# Patient Record
Sex: MALE | Race: WHITE
[De-identification: names, ages, dates, MRNs, and addresses within clinical notes are randomized per-mention and may not be internally consistent; named-entity substitution may affect disease eponyms.]

---

## 2017-08-19 ENCOUNTER — HOSPITAL ENCOUNTER (INPATIENT)
Dept: HOSPITAL 25 - ED | Age: 82
LOS: 4 days | Discharge: NURSING, REHAB OR LONG-TERM CARE | DRG: 470 | End: 2017-08-23
Attending: HOSPITALIST | Admitting: HOSPITALIST
Payer: MEDICARE

## 2017-08-19 DIAGNOSIS — H91.90: ICD-10-CM

## 2017-08-19 DIAGNOSIS — Z82.49: ICD-10-CM

## 2017-08-19 DIAGNOSIS — Z72.89: ICD-10-CM

## 2017-08-19 DIAGNOSIS — Z88.8: ICD-10-CM

## 2017-08-19 DIAGNOSIS — K21.9: ICD-10-CM

## 2017-08-19 DIAGNOSIS — D72.828: ICD-10-CM

## 2017-08-19 DIAGNOSIS — M21.061: ICD-10-CM

## 2017-08-19 DIAGNOSIS — D47.3: ICD-10-CM

## 2017-08-19 DIAGNOSIS — F31.9: ICD-10-CM

## 2017-08-19 DIAGNOSIS — R47.01: ICD-10-CM

## 2017-08-19 DIAGNOSIS — Z66: ICD-10-CM

## 2017-08-19 DIAGNOSIS — F12.90: ICD-10-CM

## 2017-08-19 DIAGNOSIS — Z88.1: ICD-10-CM

## 2017-08-19 DIAGNOSIS — Z98.42: ICD-10-CM

## 2017-08-19 DIAGNOSIS — I10: ICD-10-CM

## 2017-08-19 DIAGNOSIS — Z82.3: ICD-10-CM

## 2017-08-19 DIAGNOSIS — Z87.891: ICD-10-CM

## 2017-08-19 DIAGNOSIS — I45.2: ICD-10-CM

## 2017-08-19 DIAGNOSIS — R54: ICD-10-CM

## 2017-08-19 DIAGNOSIS — Z83.3: ICD-10-CM
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
## 2017-08-19 DIAGNOSIS — Z90.79: ICD-10-CM

## 2017-08-19 DIAGNOSIS — D62: ICD-10-CM

## 2017-08-19 DIAGNOSIS — R47.1: ICD-10-CM
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
## 2017-08-19 DIAGNOSIS — Z98.41: ICD-10-CM

## 2017-08-19 DIAGNOSIS — E78.5: ICD-10-CM

## 2017-08-19 DIAGNOSIS — Z79.02: ICD-10-CM

## 2017-08-19 DIAGNOSIS — M17.11: ICD-10-CM

## 2017-08-19 DIAGNOSIS — Z86.73: ICD-10-CM

## 2017-08-19 DIAGNOSIS — S72.001A: Primary | ICD-10-CM

## 2017-08-19 LAB
ALBUMIN SERPL BCG-MCNC: 4.5 G/DL (ref 3.2–5.2)
ALP SERPL-CCNC: 81 U/L (ref 34–104)
ALT SERPL W P-5'-P-CCNC: 31 U/L (ref 7–52)
ANION GAP SERPL CALC-SCNC: 9 MMOL/L (ref 2–11)
AST SERPL-CCNC: 44 U/L (ref 13–39)
BUN SERPL-MCNC: 19 MG/DL (ref 6–24)
BUN/CREAT SERPL: 17.3 (ref 8–20)
CALCIUM SERPL-MCNC: 9.5 MG/DL (ref 8.6–10.3)
CHLORIDE SERPL-SCNC: 97 MMOL/L (ref 101–111)
GLOBULIN SER CALC-MCNC: 3.2 G/DL (ref 2–4)
GLUCOSE SERPL-MCNC: 119 MG/DL (ref 70–100)
HCO3 SERPL-SCNC: 27 MMOL/L (ref 22–32)
HCT VFR BLD AUTO: 39 % (ref 42–52)
HGB BLD-MCNC: 13.2 G/DL (ref 14–18)
MCH RBC QN AUTO: 29 PG (ref 27–31)
MCHC RBC AUTO-ENTMCNC: 34 G/DL (ref 31–36)
MCV RBC AUTO: 86 FL (ref 80–94)
POTASSIUM SERPL-SCNC: 4 MMOL/L (ref 3.5–5)
PROT SERPL-MCNC: 7.7 G/DL (ref 6.4–8.9)
RBC # BLD AUTO: 4.53 10^6/UL (ref 4–5.4)
SODIUM SERPL-SCNC: 133 MMOL/L (ref 133–145)
TROPONIN I SERPL-MCNC: 0.03 NG/ML (ref ?–0.04)
WBC # BLD AUTO: 18.4 10^3/UL (ref 3.5–10.8)

## 2017-08-19 PROCEDURE — 85018 HEMOGLOBIN: CPT

## 2017-08-19 PROCEDURE — 85025 COMPLETE CBC W/AUTO DIFF WBC: CPT

## 2017-08-19 PROCEDURE — 81015 MICROSCOPIC EXAM OF URINE: CPT

## 2017-08-19 PROCEDURE — 80048 BASIC METABOLIC PNL TOTAL CA: CPT

## 2017-08-19 PROCEDURE — 86900 BLOOD TYPING SEROLOGIC ABO: CPT

## 2017-08-19 PROCEDURE — 85027 COMPLETE CBC AUTOMATED: CPT

## 2017-08-19 PROCEDURE — 86850 RBC ANTIBODY SCREEN: CPT

## 2017-08-19 PROCEDURE — 85610 PROTHROMBIN TIME: CPT

## 2017-08-19 PROCEDURE — 88311 DECALCIFY TISSUE: CPT

## 2017-08-19 PROCEDURE — 85014 HEMATOCRIT: CPT

## 2017-08-19 PROCEDURE — C1776 JOINT DEVICE (IMPLANTABLE): HCPCS

## 2017-08-19 PROCEDURE — 72170 X-RAY EXAM OF PELVIS: CPT

## 2017-08-19 PROCEDURE — 88305 TISSUE EXAM BY PATHOLOGIST: CPT

## 2017-08-19 PROCEDURE — 93005 ELECTROCARDIOGRAM TRACING: CPT

## 2017-08-19 PROCEDURE — 83605 ASSAY OF LACTIC ACID: CPT

## 2017-08-19 PROCEDURE — 80053 COMPREHEN METABOLIC PANEL: CPT

## 2017-08-19 PROCEDURE — 36415 COLL VENOUS BLD VENIPUNCTURE: CPT

## 2017-08-19 PROCEDURE — 71010: CPT

## 2017-08-19 PROCEDURE — 86901 BLOOD TYPING SEROLOGIC RH(D): CPT

## 2017-08-19 PROCEDURE — 81003 URINALYSIS AUTO W/O SCOPE: CPT

## 2017-08-19 PROCEDURE — 85730 THROMBOPLASTIN TIME PARTIAL: CPT

## 2017-08-19 PROCEDURE — 84484 ASSAY OF TROPONIN QUANT: CPT

## 2017-08-19 RX ADMIN — ACETAMINOPHEN SCH MG: 325 TABLET ORAL at 18:22

## 2017-08-19 NOTE — RAD
HISTORY: Fall, right hip injury



COMPARISONS: None



VIEWS: 3, Frontal view of the pelvis with frontal and frog-leg views of the right hip



FINDINGS:



BONE DENSITY: There is diffuse osteopenia.

BONES: There is a slightly impacted and angulated fracture of the subcapital right femoral

neck.    

JOINTS: There is osteoarthritis of the hips    

ALIGNMENT: There is no dislocation. 

SOFT TISSUES: Unremarkable.



OTHER FINDINGS: Degenerative changes are noted of the spine



IMPRESSION: 

SLIGHTLY ANGULATED AND IMPACTED RIGHT FEMORAL NECK FRACTURE

## 2017-08-19 NOTE — HP
CC:  Dr. Nguyễn *

 

HISTORY AND PHYSICAL:

 

DATE OF ADMISSION:  17

 

PRIMARY CARE PROVIDER:  Dr. Nguyễn.

 

CHIEF COMPLAINT:  Right hip pain.

 

HISTORY OF PRESENT ILLNESS:  Mr. Tubbs is an 84-year-old male with a 
history of CVA in 2016, hypertension, hyperlipidemia, and depression 
who presents to the emergency room after sustaining a fall the day prior to 
admission.  Per the patient, he was trying to get up out of a chair and was 
utilizing his cane, though his right leg gave out and he was unable to hold 
himself up with a cane.  The patient fell to the right.  His son was presented 
at the time of the fall and helped to get him up and over to the couch.  The 
patient lied on the couch and there were concerned that his knee may have been 
injured.  Therefore, they iced his knee.  The patient was able to get up from 
the couch and with significant assistance from the son and using a 4-wheeled 
walker walk to his bedroom.  He lied in bed all night long.  He did not sleep 
well, however, due to pain.  The patient has had 3 doses of Extra Strength 
Tylenol since the fall yesterday.  The patient had continued pain.  Therefore, 
he came to the emergency room to be evaluated.  The patient denies any 
lightheadedness, chest pain, or shortness of breath prior to or at the time of 
his fall.  He overall is feeling okay at this point and denying any significant 
pain.

 

PAST MEDICAL HISTORY:

1.  History of left posterior MCA CVA in 2016.

2.  Hypertension.

3.  Hyperlipidemia.

4.  Depression.

 

PAST SURGICAL HISTORY:

1.  Appendectomy.

2.  Prostatectomy.

3.  Bilateral cataract extraction.

4.  Tonsillectomy.

 

MEDICATIONS:

1.  Omega 3 fatty acid 1000 mg p.o. daily.

2.  Omeprazole 20 mg p.o. daily.

3.  Metoprolol tartrate 12.5 mg p.o. twice daily.

4.  Lamotrigine 225 mg p.o. q.h.s.

5.  Ferrous gluconate 325 mg p.o. daily.

6.  Vitamin B12 1000 mcg p.o. daily.

7.  Plavix 75 mg p.o. daily.

8.  Lipitor 40 mg p.o. daily.

 

ALLERGIES:  No known drug allergies.

 

FAMILY HISTORY:  Mom  of what the patient's son believes is complications 
related to dementia.  Dad had a history of CVA.

 

SOCIAL HISTORY:  The patient is a former smoker.  He quit approximately 40 plus 
years ago.  He drinks alcohol on occasion.  He is a retired .  
He is .  He has 3 children.  His son, Eligio, is his healthcare proxy.

 

REVIEW OF SYSTEMS:  A complete 11-system review of systems is obtained.  
Pertinent positives and negatives are as per HPI and otherwise negative.

 

                               PHYSICAL EXAMINATION

 

GENERAL:  The patient is a well-developed, elderly thin male, lying in bed, in 
no acute distress.

 

VITAL SIGNS:  Blood pressure 141/60, pulse 67, respirations 16, temp 98.0, O2 
sat is 96% on room air.

 

HEENT:  Pupils are equal.  They are round.  There is evidence of prior cataract 
extraction.  Extraocular muscles are intact.  Oropharynx is clear.  Oral mucosa 
is moist.  There is no submandibular, cervical, or supraclavicular adenopathy. 
Thyroid is not enlarged.  No thyroid nodules are noted.

 

PULMONARY:  Lungs are clear anteriorly and at the lateral bases.

 

CARDIAC:  Normal S1 and S2.  Regular rate and rhythm.  I do not appreciate any 
murmurs, rubs, or gallops.

 

ABDOMEN:  Bowel sounds present.  Abdomen is soft, nontender, nondistended.

 

MUSCULOSKELETAL:  The right lower extremity is shortened and externally rotated.

 

SKIN:  Warm and dry.  I did not appreciate any rashes.  There is a large area 
of purple bruising on the right forearm that the patient's son believes is from 
him trying to help him up yesterday.

 

NEURO:  Cranial nerves II through XII are grossly intact.  Sensation is intact 
to light touch throughout.  The patient is mildly dysarthric and aphasic.

 

PSYCH:  The patient appears to be alert and oriented x3.  Affect appears 
appropriate.

 

 LABORATORY DATA:  WBC 18.4, hemoglobin 13.2, hematocrit 39, platelets 848.  
INR 0.98.  Sodium 133, potassium 4.0, chloride 97, CO2 27, BUN 19, creatinine 
1.10, glucose 119, lactic acid 1.5, calcium 9.5, albumin 1.4, AST 14, ALT 31, 
alk phos 81, troponin 0.03, albumin 4.5.  Right hip x-ray revealed slightly 
angulated and impacted right femoral neck fracture.  EKG revealed sinus rhythm 
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  This is 
unchanged from prior.

 

ASSESSMENT AND PLAN:  Mr. Tubbs is an 84-year-old male who sustained a 
mechanical fall the day prior to admission and is now found to have a right hip 
fracture.

 

1.  Right hip fracture.  Management of this will be per Orthopedics.  The 
patient in terms of his cardiac risk is not terribly active at home needing a 
walker or a cane to get around.  He denies any chest pain at this point.  The 
patient had an echocardiogram in 2016, which revealed an EF of 55% to 60
%.  Mild concentric LVH was observed.  There is increased basal septal 
hypertrophy noted without evidence of an increased gradient across the left 
ventricular outflow tract.  Left ventricular wall motion contractility were 
felt to be within normal limits.  At this point, I do believe the patient is 
optimized for surgery.  He has been on Plavix, however.  Per Dr. Head from 
the Plavix standpoint, the patient go to the OR tomorrow.  I do feel that the 
patient is likely moderate to high risk given his history of being on Plavix, 
his age, and overall somewhat frail-appearing nature.  Again, I do not feel 
that any further cardiac testing would be beneficial in this patient.

2.  DVT prophylaxis will consist of subcu heparin tonight being held this 
evening for planned surgery tomorrow.  The patient will be n.p.o. after 
midnight.

3.  Hypertension.  The patient will be maintained on his usual dose of 
metoprolol tartrate.  His blood pressure is under fair controlled in 130s to 
140s range.  At this point, I do not feel that we need to be more aggressive in 
lowering his blood pressure.

4.  Depression.  The patient will continue on his usual dose of lamotrigine.

5.  History of cerebrovascular accident.  The patient will continue on his 
statin, but his Plavix will be held for now though this will need to be 
restarted as soon as possible after being okayed by Orthopedics.

6.  DVT prophylaxis.  According to the Adult Thrombosis Prophylaxis Risk Factor 
Assessment Guide, the patient has a total risk factor score of 8 making him the 
highest risk,.  He will be placed on heparin 5000 units subcutaneous q.8 hours 
and SCDs.

7.  Code status is DNR.  Again, the patient indicates that his son, Eligio, is 
his healthcare proxy.

 

TIME SPENT:  65 minutes were spent admitting this patient.

 

[id number]/[facility] #: [id number]

HUBER

## 2017-08-19 NOTE — CONS
CONSULTATION REPORT:

 

DATE OF CONSULTATION:  08/19/17

 

HISTORY OF PRESENT ILLNESS:  The patient is an 84-year-old man, a community 
ambulator with his son with a walker or cane, who presents status post a fall 
at home, mechanical, sustained last night at approximately 6:30 p.m. on 08/18/17
, who presented via the emergency department today with complaints of right hip 
pain and x-rays demonstrated at a right hip fracture.  Orthopedic Surgery 
consultation was called.

 

I am very familiar with the patient and his son as the patient is a clinic 
patient of mine.  I have managed him for multiple months nonoperatively for 
right knee osteoarthritis, severe valgus type.  The patient's recent past 
medical history has been significant for a cerebrovascular accident with a 
prolonged recovery from that.

 

The patient and his son states that last night at approximately 6:30 p.m., the 
patient was getting up from a chair and turning, with a cane in his right hand 
when he lost his footing and fell.  He landed on his right side.  This was 
unwitnessed, but the patient's son came into the room and found him on the 
ground.

 

At first, the patient and his son thought that the pain was secondary to his 
right knee, that has been painful in the past.  His right knee was iced at 
first.  The patient rested.  When the patient had pain that persisted today 
that he localized closer to the hip, the patient's son decided to bring him to 
the emergency department for analysis.

 

The patient's last dose of Plavix was this morning.  The patient had treated 
the pain at home with only Tylenol prior to being brought to the emergency 
department.

 

The patient denies any head trauma or headache after his injury.

 

PAST MEDICAL HISTORY:  Recent cerebrovascular accident, in October 2016, acute, 
left, posterior, middle cerebral artery distribution[place] [facility] 
hypertension; hyperlipidemia; depression; bipolar disorder.

 

PAST SURGICAL HISTORY:  Prostatectomy, appendectomy. 



MEDICATIONS:

1.  Lipitor.

2.  Omeprazole.

3.  Lamictal.

4.  Metoprolol.

5.  Plavix 75 mg p.o. daily.

 

ALLERGIES:  AMOXICILLIN (unknown reaction), CELEBREX (unknown reaction).

 

SOCIAL HISTORY:  Smoking, rare alcohol use, marijuana use.

 

REVIEW OF SYSTEMS:  The patient denies fever, sweats, chills.  Denies current 
chest pain and shortness of breath.  Denies headache.

 

PHYSICAL EXAMINATION:  Vital Signs:  At 3:04 p.m. on 08/19/17, temperature 98.0 
degrees Fahrenheit, pulse 82, blood pressure 127/84, respiratory rate 16, and 
pulse ox 95% on room air.

 

No acute distress.  Alert and oriented, the patient's mood and affect are 
unchanged from last clinic exam with me.  The patient is somewhat hard of 
hearing and so sometimes does not fully grasp discussion points that his son 
will try and assist. The patient has a angry-looking visage, but was positive 
about his possible recovery and happy to see me (when I first had seen the 
patient in clinic, he seemed to prone to angry outbursts, but that has changed 
over the last several months).  Well coordinated bilateral upper and lower 
extremities.  I did not assess gait as I did not want the patient to be caused 
pain.

 

The patient is lying supine on stretcher in emergency department.  His right 
lower extremity is shortened and externally rotated.  Tenderness to palpation 
on the right hip area.  Pain with flexion or extension passively of the right 
hip. Neurovascularly intact distally.  No significant soft tissue swelling or 
bruising. Skin is intact.  Strength testing not assessed secondary to it would 
cause discomfort.

 

IMAGING:  Three x-ray views of right hip were obtained and demonstrate a 
displaced fracture of the right femoral neck.

 

ASSESSMENT:

1.  Right hip displaced femoral neck fracture.

2.  Multiple medical problems including a cerebrovascular accident in October 2016, ischemic.

 

PLAN:

1.  Nonweightbearing and pain control as of now.

2.  To the operating room for right hip bipolar hemiarthroplasty.  This can be 
done when the patient is optimized per the hospitalist service to which he is 
admitted.

3.  I discussed the surgical procedure and the rehabilitation timeline with the 
patient and his son.  They understood.

4.  I just recently reviewed with literature on stoppage of Plavix 
preoperatively for hip fractures and there are multiple studies including one 
in the [place] Journal of Medicine that dictate no increased morbidity or 
increased intraoperative bleeding with the immediate operation of these 
patients rather than waiting the 5 days, which is typical for elective 
surgeries with patients on Plavix.

5.  NPO after midnight with IV fluids in case the patient can go to the 
operating room on the morning of 08/20/17.

 

 [id number]/[facility] #: [id number]

HUBER

## 2017-08-19 NOTE — RAD
HISTORY: Fall, right hip injury



COMPARISONS: February 8, 2016



VIEWS:1: Single frontal portable view of the chest at 5:35 PM



FINDINGS:

LINES AND TUBES: None.

CARDIOMEDIASTINAL SILHOUETTE: The cardiomediastinal silhouette is normal for portable

technique.

PLEURA: The costophrenic angles are sharp. No pleural abnormalities are noted.

LUNG PARENCHYMA: There is hyperinflation.

ABDOMEN: The upper abdomen is clear. There is no subphrenic gas.

BONES AND SOFT TISSUES: No bone or soft tissue abnormalities are noted.



IMPRESSION: NO ACTIVE CARDIOPULMONARY DISEASE.

## 2017-08-20 LAB
ANION GAP SERPL CALC-SCNC: 2 MMOL/L (ref 2–11)
BUN SERPL-MCNC: 19 MG/DL (ref 6–24)
BUN/CREAT SERPL: 16.7 (ref 8–20)
CALCIUM SERPL-MCNC: 8.9 MG/DL (ref 8.6–10.3)
CHLORIDE SERPL-SCNC: 99 MMOL/L (ref 101–111)
GLUCOSE SERPL-MCNC: 97 MG/DL (ref 70–100)
HCO3 SERPL-SCNC: 30 MMOL/L (ref 22–32)
HCT VFR BLD AUTO: 36 % (ref 42–52)
HGB BLD-MCNC: 11.4 G/DL (ref 14–18)
MCH RBC QN AUTO: 28 PG (ref 27–31)
MCHC RBC AUTO-ENTMCNC: 32 G/DL (ref 31–36)
MCV RBC AUTO: 86 FL (ref 80–94)
POTASSIUM SERPL-SCNC: 4.4 MMOL/L (ref 3.5–5)
RBC # BLD AUTO: 4.11 10^6/UL (ref 4–5.4)
SODIUM SERPL-SCNC: 131 MMOL/L (ref 133–145)
WBC # BLD AUTO: 11.3 10^3/UL (ref 3.5–10.8)

## 2017-08-20 PROCEDURE — 0SRR01A REPLACEMENT OF RIGHT HIP JOINT, FEMORAL SURFACE WITH METAL SYNTHETIC SUBSTITUTE, UNCEMENTED, OPEN APPROACH: ICD-10-PCS | Performed by: ORTHOPAEDIC SURGERY

## 2017-08-20 RX ADMIN — ACETAMINOPHEN SCH MG: 325 TABLET ORAL at 00:22

## 2017-08-20 RX ADMIN — ACETAMINOPHEN SCH MG: 325 TABLET ORAL at 06:05

## 2017-08-20 RX ADMIN — LAMOTRIGINE SCH MG: 100 TABLET ORAL at 20:11

## 2017-08-20 RX ADMIN — CYANOCOBALAMIN TAB 500 MCG SCH: 500 TAB at 15:51

## 2017-08-20 RX ADMIN — ACETAMINOPHEN SCH MG: 325 TABLET ORAL at 17:14

## 2017-08-20 RX ADMIN — FENTANYL CITRATE PRN MCG: 0.05 INJECTION, SOLUTION INTRAMUSCULAR; INTRAVENOUS at 14:30

## 2017-08-20 RX ADMIN — Medication SCH: at 15:51

## 2017-08-20 RX ADMIN — LAMOTRIGINE SCH MG: 25 TABLET ORAL at 20:11

## 2017-08-20 RX ADMIN — ACETAMINOPHEN SCH: 325 TABLET ORAL at 14:21

## 2017-08-20 RX ADMIN — METOPROLOL TARTRATE SCH MG: 25 TABLET, FILM COATED ORAL at 20:11

## 2017-08-20 RX ADMIN — HYDROMORPHONE HYDROCHLORIDE PRN MG: 1 INJECTION, SOLUTION INTRAMUSCULAR; INTRAVENOUS; SUBCUTANEOUS at 14:31

## 2017-08-20 RX ADMIN — FENTANYL CITRATE PRN MCG: 0.05 INJECTION, SOLUTION INTRAMUSCULAR; INTRAVENOUS at 14:52

## 2017-08-20 RX ADMIN — ATORVASTATIN CALCIUM SCH MG: 40 TABLET, FILM COATED ORAL at 17:15

## 2017-08-20 RX ADMIN — MORPHINE SULFATE PRN MG: 2 INJECTION, SOLUTION INTRAMUSCULAR; INTRAVENOUS at 17:40

## 2017-08-20 RX ADMIN — METOPROLOL TARTRATE SCH MG: 25 TABLET, FILM COATED ORAL at 09:38

## 2017-08-20 RX ADMIN — FENTANYL CITRATE PRN MCG: 0.05 INJECTION, SOLUTION INTRAMUSCULAR; INTRAVENOUS at 14:25

## 2017-08-20 RX ADMIN — FENTANYL CITRATE PRN MCG: 0.05 INJECTION, SOLUTION INTRAMUSCULAR; INTRAVENOUS at 14:18

## 2017-08-20 RX ADMIN — HYDROMORPHONE HYDROCHLORIDE PRN MG: 1 INJECTION, SOLUTION INTRAMUSCULAR; INTRAVENOUS; SUBCUTANEOUS at 14:21

## 2017-08-20 NOTE — PN
Progress Note





- Progress Note


Date of Service: 08/20/17


SOAP: 


Subjective:


Right hip pain.





Optimized/cleared per medicine.





Objective:


NAD


RLE:


- short, externally rotated


- NVID


 Selected Entries











  08/20/17





  03:33


 


Temperature 98.7 F


 


Pulse Rate 64


 


Respiratory 14





Rate 


 


Blood Pressure 113/52





(mmHg) 


 


O2 Sat by Pulse 98





Oximetry 








 Laboratory Tests











  08/19/17 08/20/17 08/20/17





  15:36 00:40 06:46


 


WBC  18.4 H   11.3 H


 


Hct  39 L   36 L


 


Plt Count    656 H D


 


Urine Nitrate   Negative 


 


Ur Leukocyte Esterase   Negative 


 


Urine Bacteria   Absent 








Assessment:


Displaced femoral neck fracture





Plan:


-  to OR for right hip bipolar hemiarthroplasty this morning


-  hold anticoagulation (heparin SQ) and Plavix


-  NPO

## 2017-08-20 NOTE — ED
Adam SMYTH Thomas, scribed for Kunal Quach MD on 08/19/17 at 1552 .





Lower Extremity





- HPI Summary


HPI Summary: 





The pt is a 83 y/o MF presenting to the ED c/o R hip pain s/p a fall yesterday 

at 12:00. The pain is rated 5/10. The pain is aggravated by movement and 

alleviated by nothing. The patient has treated the pain with Tylenol 500mg PTA. 

At the time of evaluation, he has already received pain medication and his Hip 

XR has already been read by radiology. He has no other complaints at this time. 

PMHx: HTN, arthritis, depression, bipolar disorder. PSHx: prostatectomy, 

appendectomy. SHx: smoking, rare alcohol use, marijuana use. FHx: DM, CAD. 





- History of Current Complaint


Chief Complaint: EDExtremityLower


Stated Complaint: FALL/RT HIP INJURY


Time Seen by Provider: 08/19/17 15:48


Hx Obtained From: Patient


Mechanism Of Injury: Fall From A Standing Position


Onset/Duration: Days - yesterday


Severity Currently: Moderate


Pain Intensity: 5


Pain Scale Used: 0-10 Numeric


Timing: Constant


Associated Signs And Symptoms: Positive: Negative


Aggravating Factor(s): Movement


Alleviating Factor(s): Nothing





- Allergies/Home Medications


Allergies/Adverse Reactions: 


 Allergies











Allergy/AdvReac Type Severity Reaction Status Date / Time


 


Amoxicillin Allergy  Unknown Verified 10/08/16 18:37





   Reaction  





   Details  


 


Celecoxib [From Celebrex] Allergy  Unknown Verified 10/08/16 18:37





   Reaction  





   Details  














PMH/Surg Hx/FS Hx/Imm Hx


Previously Healthy: No


Cardiovascular History: Reports: Hx Angina, Hx Hypercholesterolemia, Hx 

Hypertension


   Denies: Hx Pacemaker/ICD


GI History: Reports: Hx Hiatal Hernia


Musculoskeletal History: Reports: Hx Arthritis - right knee, Hx Back Problems - 

lower back pain


Sensory History: Reports: Hx Contacts or Glasses - not with pt, Hx Hearing 

Problem - [place], does not wear hearing aides


   Denies: Hx Hearing Aid


Opthamlomology History: Reports: Hx Contacts or Glasses - not with pt


Psychiatric History: Reports: Hx Depression, Hx Bipolar Disorder, Hx Substance 

Abuse - marijuana and acid


   Denies: Hx Panic Disorder





- Surgical History


Surgery Procedure, Year, and Place: Prostatectomy, appendix removed as a child,

  LEFT FOOT WITH METAL PIN


Infectious Disease History: No


Infectious Disease History: 


   Denies: Traveled Outside the [place] in Last 30 Days





- Family History


Known Family History: Positive: Cardiac Disease, Diabetes





- Social History


Alcohol Use: Rare


Substance Use Type: Reports: Marijuana


Substance Use Comment - Amount & Last Used: 5-6 TIMES A WEEK IN PM,2HITS


Smoking Status (MU): Light Every Day Tobacco Smoker


Type: Pipe


Length of Time of Smoking/Using Tobacco: PIPE 1-2 TIMES A DAY, DOES NOT INHALE


Have You Smoked in the Last Year: Yes - PIPE ONLY, ONCE OR TWICE A DAY





Review of Systems


Negative: Fever


Positive: Other - POS: R hip pain s/p a fall yesterday


All Other Systems Reviewed And Are Negative: Yes





Physical Exam


Triage Information Reviewed: Yes


Vital Signs On Initial Exam: 


 Initial Vitals











Temp Pulse Resp BP Pulse Ox


 


 97.9 F   61   18   108/73   98 


 


 08/19/17 11:54  08/19/17 11:54  08/19/17 11:54  08/19/17 11:54  08/19/17 11:54











Vital Signs Reviewed: Yes


Appearance: Positive: Well-Appearing, No Pain Distress, Well-Nourished


Skin: Positive: Warm, Skin Color Reflects Adequate Perfusion, Dry


Head/Face: Positive: Normal Head/Face Inspection


Eyes: Positive: Normal


ENT: Positive: Normal ENT inspection


Neck: Positive: Supple, Nontender


Respiratory/Lung Sounds: Positive: Clear to Auscultation, Breath Sounds Present


Cardiovascular: Positive: RRR


Abdomen Description: Positive: Nontender, Soft


Bowel Sounds: Positive: Present


Musculoskeletal: Positive: Other - The right leg is inwardly angulated. It is 

tender to range of motion.


Neurological: Positive: Normal


Psychiatric: Positive: Normal, Affect/Mood Appropriate





Diagnostics





- Vital Signs


 Vital Signs











  Temp Pulse Resp BP Pulse Ox


 


 08/19/17 15:38    16  


 


 08/19/17 15:04  98.0 F  82  16  127/84  95


 


 08/19/17 14:16  98.5 F  60  16  102/63  99


 


 08/19/17 11:54  97.9 F  61  18  108/73  98














- Laboratory


Lab Results: 


 Lab Results











  08/19/17 08/19/17 08/19/17 Range/Units





  15:36 15:36 15:36 


 


WBC  18.4 H    (3.5-10.8)  10^3/ul


 


RBC  4.53    (4.0-5.4)  10^6/ul


 


Hgb  13.2 L    (14.0-18.0)  g/dl


 


Hct  39 L    (42-52)  %


 


MCV  86    (80-94)  fL


 


MCH  29    (27-31)  pg


 


MCHC  34    (31-36)  g/dl


 


RDW  15    (10.5-15)  %


 


Plt Count  848 H    (150-450)  10^3/ul


 


MPV  8    (7.4-10.4)  um3


 


Neut % (Auto)  86.7 H    (38-83)  %


 


Lymph % (Auto)  5.1 L    (25-47)  %


 


Mono % (Auto)  6.1    (1-9)  %


 


Eos % (Auto)  1.3    (0-6)  %


 


Baso % (Auto)  0.8    (0-2)  %


 


Absolute Neuts (auto)  15.9 H    (1.5-7.7)  10^3/ul


 


Absolute Lymphs (auto)  0.9 L    (1.0-4.8)  10^3/ul


 


Absolute Monos (auto)  1.1 H    (0-0.8)  10^3/ul


 


Absolute Eos (auto)  0.2    (0-0.6)  10^3/ul


 


Absolute Basos (auto)  0.1    (0-0.2)  10^3/ul


 


Absolute Nucleated RBC  0    10^3/ul


 


Nucleated RBC %  0    


 


INR (Anticoag Therapy)   0.98   (0.89-1.11)  


 


APTT   38.9 H   (26.0-36.3)  seconds


 


Sodium    133  (133-145)  mmol/L


 


Potassium    4.0  (3.5-5.0)  mmol/L


 


Chloride    97 L  (101-111)  mmol/L


 


Carbon Dioxide    27  (22-32)  mmol/L


 


Anion Gap    9  (2-11)  mmol/L


 


BUN    19  (6-24)  mg/dL


 


Creatinine    1.10  (0.67-1.17)  mg/dL


 


Est GFR ( Amer)    82.0  (>60)  


 


Est GFR (Non-Af Amer)    63.8  (>60)  


 


BUN/Creatinine Ratio    17.3  (8-20)  


 


Glucose    119 H  ()  mg/dL


 


Lactic Acid     (0.5-2.0)  mmol/L


 


Calcium    9.5  (8.6-10.3)  mg/dL


 


Total Bilirubin    1.00  (0.2-1.0)  mg/dL


 


AST    44 H  (13-39)  U/L


 


ALT    31  (7-52)  U/L


 


Alkaline Phosphatase    81  ()  U/L


 


Troponin I    0.03  (<0.04)  ng/mL


 


Total Protein    7.7  (6.4-8.9)  g/dL


 


Albumin    4.5  (3.2-5.2)  g/dL


 


Globulin    3.2  (2-4)  g/dL


 


Albumin/Globulin Ratio    1.4  (1-3)  


 


Blood Type     


 


Antibody Screen     














  08/19/17 08/19/17 Range/Units





  15:36 15:36 


 


WBC    (3.5-10.8)  10^3/ul


 


RBC    (4.0-5.4)  10^6/ul


 


Hgb    (14.0-18.0)  g/dl


 


Hct    (42-52)  %


 


MCV    (80-94)  fL


 


MCH    (27-31)  pg


 


MCHC    (31-36)  g/dl


 


RDW    (10.5-15)  %


 


Plt Count    (150-450)  10^3/ul


 


MPV    (7.4-10.4)  um3


 


Neut % (Auto)    (38-83)  %


 


Lymph % (Auto)    (25-47)  %


 


Mono % (Auto)    (1-9)  %


 


Eos % (Auto)    (0-6)  %


 


Baso % (Auto)    (0-2)  %


 


Absolute Neuts (auto)    (1.5-7.7)  10^3/ul


 


Absolute Lymphs (auto)    (1.0-4.8)  10^3/ul


 


Absolute Monos (auto)    (0-0.8)  10^3/ul


 


Absolute Eos (auto)    (0-0.6)  10^3/ul


 


Absolute Basos (auto)    (0-0.2)  10^3/ul


 


Absolute Nucleated RBC    10^3/ul


 


Nucleated RBC %    


 


INR (Anticoag Therapy)    (0.89-1.11)  


 


APTT    (26.0-36.3)  seconds


 


Sodium    (133-145)  mmol/L


 


Potassium    (3.5-5.0)  mmol/L


 


Chloride    (101-111)  mmol/L


 


Carbon Dioxide    (22-32)  mmol/L


 


Anion Gap    (2-11)  mmol/L


 


BUN    (6-24)  mg/dL


 


Creatinine    (0.67-1.17)  mg/dL


 


Est GFR ( Amer)    (>60)  


 


Est GFR (Non-Af Amer)    (>60)  


 


BUN/Creatinine Ratio    (8-20)  


 


Glucose    ()  mg/dL


 


Lactic Acid  1.5   (0.5-2.0)  mmol/L


 


Calcium    (8.6-10.3)  mg/dL


 


Total Bilirubin    (0.2-1.0)  mg/dL


 


AST    (13-39)  U/L


 


ALT    (7-52)  U/L


 


Alkaline Phosphatase    ()  U/L


 


Troponin I    (<0.04)  ng/mL


 


Total Protein    (6.4-8.9)  g/dL


 


Albumin    (3.2-5.2)  g/dL


 


Globulin    (2-4)  g/dL


 


Albumin/Globulin Ratio    (1-3)  


 


Blood Type   O Positive  


 


Antibody Screen   Negative  











Result Diagrams: 


 08/20/17 06:46





 08/20/17 06:46


Lab Statement: Any lab studies that have been ordered have been reviewed, and 

results considered in the medical decision making process.





- Radiology


  ** Hip XR


Xray Interpretation: Positive (See Comments) - SLIGHTLY ANGULATED AND IMPACTED 

RIGHT FEMORAL NECK FRACTURE.


Radiology Interpretation Completed By: Radiologist





Lower Extremity Course/Dx





- Course


Course Of Treatment: Mr. Tubbs fell yesterday and sustained a right hip 

fracture.  He is being admitted to the hospitalist service with a consult by 

Dr. Olson.





- Diagnoses


Provider Diagnoses: 


 Closed right hip fracture








- Physician Notifications


Discussed Care Of Patient With: Amelia Pemberton


Time Discussed With Above Provider: 15:54


Instructed by Provider To: Other - Dr. Pemberton, hospitalist, admits the patient 

at 15:54.





Discharge





- Discharge Plan


Condition: Fair


Disposition: ADMITTED TO [facility] documentation as recorded by the Adam desouza Thomas accurately reflects 

the service I personally performed and the decisions made by me, Kunal Quach MD.

## 2017-08-20 NOTE — RAD
INDICATION: Right hip arthroplasty     



COMPARISON: August 19, 2017 

 

TECHNIQUE: A single view the pelvis is submitted.



FINDINGS: There is right hip arthroplasty. The prosthesis appears well seated. There are

skin staples with soft tissue changes compatible with the recent surgery. Multiple

surgical loops project over the minor pelvis consistent with prostatectomy



IMPRESSION:  RIGHT HIP ARTHROPLASTY. THE PROSTHESIS APPEARS WELL SEATED

## 2017-08-20 NOTE — PN
Subjective


Date of Service: 08/20/17


Interval History: 


HOSPITALIST PROGRESS NOTE


Patient seen and examined at bedside.


He states his pain is controlled, offers no complaints at this time.


Family History: Unchanged from Admission


Social History: Unchanged from Admission


Past Medical History: Unchanged from Admission





Objective


Active Medications: 








Acetaminophen (Tylenol Tab*)  975 mg PO Q6HR [facility]


   Last Admin: 08/20/17 06:05 Dose:  975 mg


Atorvastatin Calcium (Lipitor*)  40 mg PO 1700 [facility]


Cyanocobalamin (Vitamin B12 Tab*)  1,000 mcg PO DAILY [facility]


Dimenhydrinate (Dramamine Iv*)  12.5 mg IV PUSH ONCE PRN


   PRN Reason: NAUSEA/VOMITING


   Stop: 08/20/17 12:33


Fentanyl Citrate (Fentanyl*)  25 mcg IV Q5M PRN


   PRN Reason: PAIN - MODERATE


   Stop: 08/20/17 12:53


Ferrous Gluconate (Fergon Tab*)  325 mg PO DAILY [facility]


Hydromorphone HCl (Dilaudid Iv*)  0.1 mg IV Q10M PRN


   PRN Reason: PAIN - SEVERE


   Stop: 08/20/17 13:13


Lactated Ringer's (Lactated Ringers 1000 Ml Bag*)  1,000 mls @ 125 mls/hr IV 

PER RATE [facility]


   Last Admin: 08/20/17 06:01 Dose:  125 mls/hr


Lamotrigine (Lamictal Tab(*))  200 mg PO BEDTIME [facility]


Lamotrigine (Lamictal Tab(*))  25 mg PO BEDTIME [facility]


Metoprolol Tartrate (Lopressor Tab*)  12.5 mg PO Q12HR [facility]


   Last Admin: 08/20/17 09:38 Dose:  12.5 mg


Morphine Sulfate (Morphine Inj (Syringe)*)  2 mg IV Q4H PRN


   PRN Reason: PAIN - MILD


Omeprazole (Prilosec Cap*)  20 mg PO DAILY [facility]


   Last Admin: 08/20/17 09:38 Dose:  20 mg


Oxycodone HCl (Roxycodone Tab*)  5 mg PO Q4H PRN


   PRN Reason: PAIN








Vital Signs











  08/20/17





  08:07


 


Temperature 97.8 F


 


Pulse Rate 64


 


Respiratory 12





Rate 


 


Blood Pressure 126/60





(mmHg) 


 


O2 Sat by Pulse 98





Oximetry 











Oxygen Devices in Use Now: Nasal Cannula


Appearance: Elderly male lying in bed in NAD.


Eyes: No Scleral Icterus


Ears/Nose/Mouth/Throat: Mucous Membranes Moist


Neck: Trachea Midline


Respiratory: Symmetrical Chest Expansion and Respiratory Effort, Clear to 

Auscultation


Cardiovascular: RRR - Normal S1 and S2


Abdominal: NL Sounds; No Tenderness; No Distention


Extremities: No Edema, - - RLE shortened, externally rotated


Neurological: Alert and Oriented x 3, NL Muscle Strength and Tone


Lines/Tubes/Other Access: Clean, Dry and Intact Peripheral IV


Result Diagrams: 


 08/20/17 06:46





 08/20/17 06:46





Assess/Plan/Problems-Billing


Assessment: 


Mr. Tubbs is an 83yo M with PMH of CVA, HTN, HLD, depression, who 

presented to ED with c/o right hip pain after a mechanical fall, found to have 

right femoral neck fracture.





- Patient Problems


(1) Fracture of femoral neck, right, closed


Comment: - Ortho input appreciated - plan for OR today.


- Patient has no complaints of CP, dyspnea, palpitations. Agree with Dr. Pemberton'

s assessment patient is optimized for surgery.   





(2) Thrombocytosis


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 

elevated since 2015.


- Probably higher now due to stress associated with fall and fracture.


- Patient not interested in further w/u, but can continue conversations as 

outpatient.   





(3) Hypertension


Comment: - Controlled.


- Continue Metoprolol.   





(4) Hyperlipidemia


Comment: - Continue statin.   





(5) H/O: CVA (cerebrovascular accident)


Comment: - Plavix on hold - will resume when okay with Ortho.   





(6) DNR (do not resuscitate)





Status and Disposition: 


Inpatient for surgical management of hip fracture.

## 2017-08-21 LAB
ANION GAP SERPL CALC-SCNC: 1 MMOL/L (ref 2–11)
BUN SERPL-MCNC: 17 MG/DL (ref 6–24)
BUN/CREAT SERPL: 17.5 (ref 8–20)
CALCIUM SERPL-MCNC: 8.2 MG/DL (ref 8.6–10.3)
CHLORIDE SERPL-SCNC: 97 MMOL/L (ref 101–111)
GLUCOSE SERPL-MCNC: 109 MG/DL (ref 70–100)
HCO3 SERPL-SCNC: 29 MMOL/L (ref 22–32)
HCT VFR BLD AUTO: 26 % (ref 42–52)
HGB BLD-MCNC: 8.6 G/DL (ref 14–18)
POTASSIUM SERPL-SCNC: 4.2 MMOL/L (ref 3.5–5)
SODIUM SERPL-SCNC: 127 MMOL/L (ref 133–145)

## 2017-08-21 RX ADMIN — MORPHINE SULFATE PRN MG: 2 INJECTION, SOLUTION INTRAMUSCULAR; INTRAVENOUS at 14:58

## 2017-08-21 RX ADMIN — ACETAMINOPHEN SCH MG: 325 TABLET ORAL at 00:20

## 2017-08-21 RX ADMIN — MORPHINE SULFATE PRN MG: 2 INJECTION, SOLUTION INTRAMUSCULAR; INTRAVENOUS at 19:43

## 2017-08-21 RX ADMIN — OXYCODONE HYDROCHLORIDE AND ACETAMINOPHEN PRN TAB: 5; 325 TABLET ORAL at 11:26

## 2017-08-21 RX ADMIN — LAMOTRIGINE SCH MG: 25 TABLET ORAL at 22:09

## 2017-08-21 RX ADMIN — PANTOPRAZOLE SODIUM SCH MG: 40 TABLET, DELAYED RELEASE ORAL at 09:35

## 2017-08-21 RX ADMIN — OXYCODONE HYDROCHLORIDE AND ACETAMINOPHEN PRN TAB: 5; 325 TABLET ORAL at 22:07

## 2017-08-21 RX ADMIN — ACETAMINOPHEN SCH MG: 325 TABLET ORAL at 06:19

## 2017-08-21 RX ADMIN — LAMOTRIGINE SCH MG: 100 TABLET ORAL at 22:08

## 2017-08-21 RX ADMIN — CLOPIDOGREL SCH MG: 75 TABLET, FILM COATED ORAL at 09:35

## 2017-08-21 RX ADMIN — OXYCODONE HYDROCHLORIDE AND ACETAMINOPHEN PRN TAB: 5; 325 TABLET ORAL at 17:02

## 2017-08-21 RX ADMIN — METOPROLOL TARTRATE SCH: 25 TABLET, FILM COATED ORAL at 08:21

## 2017-08-21 RX ADMIN — METOPROLOL TARTRATE SCH: 25 TABLET, FILM COATED ORAL at 22:09

## 2017-08-21 RX ADMIN — ENOXAPARIN SODIUM SCH MG: 40 INJECTION SUBCUTANEOUS at 06:19

## 2017-08-21 RX ADMIN — CYANOCOBALAMIN TAB 500 MCG SCH MCG: 500 TAB at 09:35

## 2017-08-21 RX ADMIN — ATORVASTATIN CALCIUM SCH MG: 40 TABLET, FILM COATED ORAL at 17:02

## 2017-08-21 RX ADMIN — Medication SCH MG: at 09:35

## 2017-08-21 NOTE — PN
Subjective


Date of Service: 08/21/17


Interval History: 


HOSPITALIST PROGRESS NOTE


Patient seen and examined at bedside.


He feels well today. Was able to ambulate with PT to his door and back. Pain is 

controlled.


Family History: Unchanged from Admission


Social History: Unchanged from Admission


Past Medical History: Unchanged from Admission





Objective


Active Medications: 








Acetaminophen (Tylenol Tab*)  975 mg PO Q6HR PRN


   PRN Reason: mild pain/fever


Atorvastatin Calcium (Lipitor*)  40 mg PO 1700 [facility]


   Last Admin: 08/20/17 17:15 Dose:  40 mg


Clopidogrel Bisulfate (Plavix Tab*)  75 mg PO DAILY [facility]


   Last Admin: 08/21/17 09:35 Dose:  75 mg


Cyanocobalamin (Vitamin B12 Tab*)  1,000 mcg PO DAILY [facility]


   Last Admin: 08/21/17 09:35 Dose:  1,000 mcg


Enoxaparin Sodium (Lovenox(*))  40 mg SUBCUT Q24H [facility]


   Last Admin: 08/21/17 06:19 Dose:  40 mg


Ferrous Gluconate (Fergon Tab*)  325 mg PO DAILY [facility]


   Last Admin: 08/21/17 09:35 Dose:  324 mg


Lamotrigine (Lamictal Tab(*))  100 mg PO BEDTIME [facility]


   Last Admin: 08/20/17 20:11 Dose:  100 mg


Lamotrigine (Lamictal Tab(*))  25 mg PO BEDTIME [facility]


   Last Admin: 08/20/17 20:11 Dose:  25 mg


Magnesium Hydroxide (Milk Of Magnesia Liq*)  30 ml PO Q4H PRN


   PRN Reason: CONSTIPATION


Metoprolol Tartrate (Lopressor Tab*)  12.5 mg PO Q12HR [facility]


   Last Admin: 08/21/17 08:21 Dose:  Not Given


Morphine Sulfate (Morphine Inj (Syringe)*)  2 mg IV Q3H PRN


   PRN Reason: PAIN


   Last Admin: 08/21/17 14:58 Dose:  2 mg


Ondansetron HCl (Zofran Inj*)  4 mg IV Q6H PRN


   PRN Reason: NAUSEA/VOMITING


   Last Admin: 08/20/17 17:15 Dose:  4 mg


Oxycodone/Acetaminophen (Percocet 5/325 Tab*)  1 tab PO Q4H PRN


   PRN Reason: PAIN - MODERATE


Oxycodone/Acetaminophen (Percocet 5/325 Tab*)  2 tab PO Q4H PRN


   PRN Reason: PAIN - SEVERE


   Last Admin: 08/21/17 11:26 Dose:  2 tab


Pantoprazole Sodium (Protonix Tab (Nf))  40 mg PO DAILY [facility]


   Last Admin: 08/21/17 09:35 Dose:  40 mg








Vital Signs











  08/21/17 08/21/17 08/21/17





  11:35 13:26 14:58


 


Temperature 98.1 F  


 


Pulse Rate 92  


 


Respiratory 16 16 16





Rate   


 


Blood Pressure 128/59  





(mmHg)   


 


O2 Sat by Pulse 94  





Oximetry   











Oxygen Devices in Use Now: Nasal Cannula


Appearance: Pleasant elderly male sitting up in a recliner in NAD.


Eyes: No Scleral Icterus


Ears/Nose/Mouth/Throat: Mucous Membranes Moist


Neck: Trachea Midline


Respiratory: Symmetrical Chest Expansion and Respiratory Effort, Clear to 

Auscultation


Cardiovascular: RRR - Normal S1 and S2


Abdominal: NL Sounds; No Tenderness; No Distention


Neurological: - - AAOx2 (self and place), LAL


Lines/Tubes/Other Access: Clean, Dry and Intact Peripheral IV


Nutrition: Taking PO's


Result Diagrams: 


 08/21/17 06:19





 08/21/17 06:19





Assess/Plan/Problems-Billing


Assessment: 


Mr. Tubbs is an 85yo M with PMH of CVA, HTN, HLD, depression, who 

presented to ED with c/o right hip pain after a mechanical fall, found to have 

right femoral neck fracture.





- Patient Problems


(1) Fracture of femoral neck, right, closed


Comment: - S/p right hemiarthroplasty done 08/20/17.


- Management as per Ortho.


- Continue PT/OT, awaiting PMRU eval.


- D/c IVF and Bates.   





(2) Acute blood loss anemia


Comment: - Suspect patient was hemoconcentrated on admission and had drop due 

to surgical blood loss.


- Continue to monitor H/H - will transfuse if Hb<7.0.   





(3) Thrombocytosis


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 

elevated since 2015.


- Probably higher now due to stress associated with fall and fracture.


- Patient not interested in further w/u, but can continue conversations as 

outpatient.   





(4) Hypertension


Comment: - Controlled.


- Continue Metoprolol.   





(5) Hyperlipidemia


Comment: - Continue statin.   





(6) H/O: CVA (cerebrovascular accident)


Comment: - Resumed Plavix today.   





(7) DNR (do not resuscitate)





Status and Disposition: 


Inpatient for surgical management of hip fracture.

## 2017-08-21 NOTE — OP
DATE OF OPERATION:  08/20/17 - ROOM #338

 

DATE OF BIRTH:  [birth year]

 

SURGEON:  Chu Head MD

 

ASSISTANT:  DYLLAN Stoddard.  A physician assistant was required through the 
length of this procedure for positioning and retraction.

 

ANESTHESIOLOGIST:  Stanley Milligan MD

 

ANESTHESIA:  General anesthesia, local anesthesia 10 cc of Marcaine with 
epinephrine.

 

PRE-OP DIAGNOSIS:  Displaced right hip femoral neck fracture.

 

POST-OP DIAGNOSIS:  Displaced right femoral neck hip fracture.

 

OPERATIVE PROCEDURE:  Right hip bipolar hemiarthroplasty.

 

INDICATIONS:  The patient is an 84-year-old man, a community ambulator with his 
son with a walker or cane, a patient whom I have treated in clinic for right 
knee osteoarthritis, with status post cerebrovascular accident, October of 2016
, and on Plavix, who sustained a fracture of his right hip at approximately 6:
30 p.m. 2 days prior to surgery on 08/18/17.

 

The patient and his son state that he got up from a chair and was using a cane 
and turned but that he fell when he turned.  He landed on his right side.  The 
patient had his fall unwitnessed but the patient's son found him on the ground.
  The patient and his son first thought it was a knee problem and so the 
patient had his right knee iced.  When the patient's lower extremity did not 
improve by the next day, the patient came into the emergency department in the 
afternoon of 08[place]19/17 [place] [facility] and [place] was consulted.

 

Radiographs from the [facility] demonstrated displaced right 
femoral neck fracture.  The patient was admitted to the hospitalist service, I 
left a consultation note.  The patient's Plavix was stopped, although he had 
received a dose on the morning of 08/19/17.  The patient was made n.p.o. after 
midnight for surgery.  The hospitalist service optimized and cleared the 
patient.

 

I discussed with the patient as well as his son benefits, risks, and potential 
complications of surgery.  Risks and complications were listed as including 
bleeding, infection, nerve or blood vessel injury, periprosthetic fracture, 
dislocation, death.

 

I discussed these with the patient and the son who brought him into the 
emergency room.  I also briefly discussed them with the patient's other son who 
is his power of  and signed the patient's surgical consent form after 
the patient himself had signed it.

 

IV FLUIDS:  2200 cc crystalloid.

 

ANTIBIOSIS:  Clindamycin 900 mg IV.

 

COMPLICATIONS:  None.

 

ESTIMATED BLOOD LOSS:  300 cc.

 

SPECIMEN:  Femoral head.

 

IMPLANTS:  Michael M/L taper hip prosthesis, right, press-fit standard offset. 
Femoral head 28 mm in diameter, 

+3.5 mm neck length.  Bipolar cup liner 28-mm inner diameter for use with 50- 
to 52-mm outside diameter shells.  Bipolar cup shell.

 

URINE OUTPUT:  See Anesthesia note.

 

DESCRIPTION OF PROCEDURE:  Preoperative written consent was obtained as 
detailed above.  Operative limb was marked in the patient's floor room.  The 
patient was taken back to the operating room and intubated by Anesthesia.  This 
was done on the operating room table.  The patient was then placed into the 
lateral decubitus position.  An axillary roll was placed, inferior to the 
axilla.  A Bates was placed with the patient in the lateral decubitus position.
  On the pegboards, 4 pegs were placed, posteriorly about the sacrum and upper 
back and anteriorly about the pubis and chest.  The patient was nicely in 
aligned straight lateral decubitus position. Operative hip could clearly be 
flexed and adducted without any obstruction.  Pins were well padded.

 

A nonsterile U drape and 10-10 drapes were used to drape out the surgical area 
prior to prep.  Prep was then performed with a ChloraPrep stick starting from 
the foot and then including the entire lower extremity to superior to the iliac 
crest.

 

Draping was done.  Surgical time-out was performed.  A skin incision was then 
made with a 10 blade.  I drew an almost straight line with the knee flexed in 
80 degrees of flexion just posterior to the midline of the proximal femur, with 
the incision 50% proximal to the greater trochanter and 50% distal to it.  I 
then placed the hip back into a position of only 10 or 20 degrees of flexion.  
The skin incision was made with a 10 blade.  Deep knife was then used to incise 
through subcutaneous tissue down to the gluteus jaylen fascia and iliotibial 
band.  Retractors were placed, not much hemostasis was required with Bovie 
electrocautery.  I then abducted the hip to take pressure off the iliotibial 
band.  I then a made a curving incision in the gluteus jaylen fascia and 
iliotibial band.  I then spread the gluteus jaylen muscle fibers proximally 
and had my assistant Bovie any bleeders. I then extended and internally rotated 
the hip.

 

Charnley retractor was not required as the patient was quite thin.  I used a 
Schnidt forceps to grab bursa overlying the external rotators and then Bovie 
through that bursa.  I was then able to have good visualization of the external 
rotators including the piriformis.  I identified just superior to the 
piriformis the plane between the superior capsule and the gluteus minimus.  I 
placed an osteotome between these two and followed it with a Cobra.  I 
retracted likely on posterior tissues with a Cobra placed about the lesser 
trochanter.  I then carefully peeled the external rotators off the proximal 
femur.  Just prior to releasing the piriformis, I placed a #2 FiberWire stitch 
in it right near its insertion on the femur.  I removed from bone with cautery 
first the external rotators and the piriformis.  I then as a separate layer 
removed the posterior capsule.  I made trapezoidal capsular cut, maximally 
freeing up this capsule to use in an excellent repair for the end of the case.

 

The patient was remarkable in that he had a very stout capsular tissue as well 
as external rotators.  I placed 4 stitches, figure-of-8, including the one in 
the piriformis into the posterior tissues.  Two of these stitches were in the 
posterior capsule, one within the piriformis and one within the gemellus 
musculotendinous units.  I was aware throughout the location of the sciatic 
nerve and the fat about it.  It was clear that that those muscles and tendons 
would make a stout repair construct at the conclusion of the case.

 

After the external rotators had been taken down, I then repositioned a Cobra 
about the inferior neck and lesser tuberosity trochanter.

 

I then flexed, adducted, and internally rotated the hip.  I made a marking with 
electrocautery where I wanted my femoral neck cut to be.  I used a broach 
alignment guide to pick the obliquity of my cut and I chose it just over 1 cm 
proximal to the lesser trochanter.  Retractors were placed.  Oscillating saw 
was used to make femoral neck cut.  Went all the way through.  Cut neck was 
removed.  I then removed the femoral head with an ice cream scoop type device.  
Femoral head was sized to a 51 mm.  I trialed both the 51 and a 52-mm head as 
the size of the native head was slightly in the plus direction.  The 51-mm head 
clearly sat down better in the acetabulum, so I picked that as my femoral head 
size.

 

I then returned the hip to the flexed, internally rotated, and adducted 
position. Retractors were placed again.  I then placed a canal finder followed 
by the lateralizing reamer.

 

I was prepared to place a cemented or uncemented stem.  My default is 
uncemented but given some osteopenia on preoperative x-ray, I was ready to 
place a cemented stem.  The patient's medullary bone seemed decent and so I 
went forward assuming I will be able to place a noncemented stem.

 

I broached up to a 13.5-mm broach.  I had initially broached to 12.5 and then 
trialed with the head and neck up to 7 mm in head offset.  While that was 
incredibly stable and not too long, I thought that I would go one side higher 
with the broach as my neck cut was just above where the implant head sunk 
inferior to. Therefore, I broached to 13.5 mm.  I then trialed head and neck 
and liked my range of motion and my stability.  The hip was stable to a high 
amount of internal rotation.  I trialed with a 0 head.  This got me to 65 or 70 
degrees of internal rotation with the hip flexed in adducted until any 
instability was encountered.  We did not have a 3.5-mm head trial.  I therefore 
decided I wanted a little bit more stability in that likely go with the 3.5-mm 
final head size.  I then placed a final implant, 13.5 in the femur.  I liked 
the fit.  Both when I moved the 13.5-mm broach and the final implant, the whole 
leg moved side to side; upper leg, thigh, and lower leg confirming excellent 
fit.  There was no worrisome change in sound while I placed the reamer and the 
final implant into place.

 

With the femoral stem in place, I then irrigated profusely the wound.  I then 
trialed again and then placed my final implants as noted above in the implant 
section.  I picked a 3.5-mm plus head.  The hip was incredibly stable.  It 
maintained its stability with hip flexion to 90 degrees, adduction and internal 
rotation to 80 degrees.  The leg lengths were noted to be approximately equal. 
This was noted by feel through the drapes as I could feel the contralateral 
patella and foot.

 

Irrigation.  I placed 2 drill holes in the posterior aspect of the proximal 
femur. I pulled 4 sets of sutures through these 2 drill holes and tied the 
first and third and second and fourth pairs of sutures together.  I did this 
with the hip in a slightly externally rotated and fully extended position.  
Irrigation.  Closure of the gluteus jaylen and iliotibial band with a figure-of
-8 stitch using Vicryl 0 suture and then several running stitches using the 
same suture material. Irrigation.  Closure of subcutaneous tissue with buried 
simple stitches using Vicryl 2-0 suture.  Closure of the skin with staples.  
The patient had 10 cc of 0.5% Marcaine with epinephrine placed into the 
subcutaneous and deeper tissues after the staples had been placed in the skin 
but before the Xeroform had been applied to the skin.  Xeroform, 4x4's, ABD, 
foam tape.  The patient was returned to the supine position.  An abduction 
brace was placed.  The patient was awakened and extubated.

 

DISPOSITION:  The patient would be readmitted to the hospitalist service 
postoperatively with Orthopedics consulting.  I discussed with the hospitalist 
attending the plan.  The patient will have pain controlled with oral and/or IV 
narcotics as needed.  The patient will be returned to Plavix tomorrow morning 
for stroke prevention.  He will also be on the Lovenox 40 mg subcu daily 
starting the morning of 08/21/17, 40 mg subcu daily x4 weeks for DVT 
prophylaxis.  The Plavix is required as the patient had a stroke previously on 
aspirin.  The patient will be weightbearing as tolerated and will do physical 
therapy.  The patient will get 3 doses of clindamycin IV for infection 
prophylaxis.  The patient will follow up with me 2 weeks postoperatively in 
clinic for x-rays and removal of staples.

 

 [id number]/[facility] #: [id number]

[facility]

## 2017-08-21 NOTE — PN
Progress Note





- Progress Note


Date of Service: 08/21/17


SOAP: 


Subjective:


POD #1 Right hip hemiarthroplasty. States that he is doing pretty well, no c/o 

pain at this time. Denies CP/SOB, calf pain, f/c.





Objective:


Vitals:








Temp Pulse Resp BP Pulse Ox


 


 98.1 F   92   16   128/59   94 


 


 08/21/17 11:35  08/21/17 11:35  08/21/17 11:35  08/21/17 11:35  08/21/17 11:35








Gen: A&Ox2, confused to date. NAD at rest sitting in bed


Right Hip: Dressing C/D/I, thigh soft, NT. +f/e at knee, ankle and MTPs. N/V 

intact





Labs: 


 Laboratory Results - last 24 hr











  08/21/17 08/21/17





  06:19 06:19


 


Hgb   8.6 L


 


Hct   26 L


 


Sodium  127 L 


 


Potassium  4.2 


 


Chloride  97 L 


 


Carbon Dioxide  29 


 


Anion Gap  1 L 


 


BUN  17 


 


Creatinine  0.97 


 


Est GFR ( Amer)  94.8 


 


Est GFR (Non-Af Amer)  73.7 


 


BUN/Creatinine Ratio  17.5 


 


Glucose  109 H 


 


Calcium  8.2 L 











Assessment:


POD #1 Right hip hemiarthroplasty





Plan:


PT/OT with WBAT RLE


PMRU consult


Lovenox for DVT ppx

## 2017-08-22 LAB
ANION GAP SERPL CALC-SCNC: 3 MMOL/L (ref 2–11)
BUN SERPL-MCNC: 19 MG/DL (ref 6–24)
BUN/CREAT SERPL: 17.9 (ref 8–20)
CALCIUM SERPL-MCNC: 8.5 MG/DL (ref 8.6–10.3)
CHLORIDE SERPL-SCNC: 102 MMOL/L (ref 101–111)
GLUCOSE SERPL-MCNC: 114 MG/DL (ref 70–100)
HCO3 SERPL-SCNC: 30 MMOL/L (ref 22–32)
HCT VFR BLD AUTO: 24 % (ref 42–52)
HGB BLD-MCNC: 8.1 G/DL (ref 14–18)
MCH RBC QN AUTO: 29 PG (ref 27–31)
MCHC RBC AUTO-ENTMCNC: 34 G/DL (ref 31–36)
MCV RBC AUTO: 86 FL (ref 80–94)
POTASSIUM SERPL-SCNC: 4.6 MMOL/L (ref 3.5–5)
RBC # BLD AUTO: 2.78 10^6/UL (ref 4–5.4)
SODIUM SERPL-SCNC: 135 MMOL/L (ref 133–145)
WBC # BLD AUTO: 8.6 10^3/UL (ref 3.5–10.8)

## 2017-08-22 RX ADMIN — METOPROLOL TARTRATE SCH MG: 25 TABLET, FILM COATED ORAL at 21:29

## 2017-08-22 RX ADMIN — LAMOTRIGINE SCH MG: 25 TABLET ORAL at 21:30

## 2017-08-22 RX ADMIN — CLOPIDOGREL SCH MG: 75 TABLET, FILM COATED ORAL at 08:48

## 2017-08-22 RX ADMIN — OXYCODONE HYDROCHLORIDE AND ACETAMINOPHEN PRN TAB: 5; 325 TABLET ORAL at 08:21

## 2017-08-22 RX ADMIN — PANTOPRAZOLE SODIUM SCH MG: 40 TABLET, DELAYED RELEASE ORAL at 08:50

## 2017-08-22 RX ADMIN — ATORVASTATIN CALCIUM SCH MG: 40 TABLET, FILM COATED ORAL at 17:26

## 2017-08-22 RX ADMIN — LAMOTRIGINE SCH MG: 100 TABLET ORAL at 21:30

## 2017-08-22 RX ADMIN — METOPROLOL TARTRATE SCH MG: 25 TABLET, FILM COATED ORAL at 08:48

## 2017-08-22 RX ADMIN — Medication SCH MG: at 08:48

## 2017-08-22 RX ADMIN — CYANOCOBALAMIN TAB 500 MCG SCH MCG: 500 TAB at 08:48

## 2017-08-22 RX ADMIN — ENOXAPARIN SODIUM SCH MG: 40 INJECTION SUBCUTANEOUS at 08:16

## 2017-08-22 NOTE — PN
Subjective


Date of Service: 08/22/17


Interval History: 


HOSPITALIST PROGRESS NOTE


Patient seen and examined at bedside.


C/o hip pain, but otherwise feels well.


Family History: Unchanged from Admission


Social History: Unchanged from Admission


Past Medical History: Unchanged from Admission





Objective


Active Medications: 








Acetaminophen (Tylenol Tab*)  975 mg PO Q6HR PRN


   PRN Reason: mild pain/fever


Atorvastatin Calcium (Lipitor*)  40 mg PO 1700 [facility]


   Last Admin: 08/21/17 17:02 Dose:  40 mg


Clopidogrel Bisulfate (Plavix Tab*)  75 mg PO DAILY [facility]


   Last Admin: 08/21/17 09:35 Dose:  75 mg


Cyanocobalamin (Vitamin B12 Tab*)  1,000 mcg PO DAILY [facility]


   Last Admin: 08/21/17 09:35 Dose:  1,000 mcg


Enoxaparin Sodium (Lovenox(*))  40 mg SUBCUT Q24H [facility]


   Last Admin: 08/22/17 08:16 Dose:  40 mg


Ferrous Gluconate (Fergon Tab*)  325 mg PO DAILY [facility]


   Last Admin: 08/21/17 09:35 Dose:  324 mg


Lamotrigine (Lamictal Tab(*))  100 mg PO BEDTIME [facility]


   Last Admin: 08/21/17 22:08 Dose:  100 mg


Lamotrigine (Lamictal Tab(*))  25 mg PO BEDTIME [facility]


   Last Admin: 08/21/17 22:09 Dose:  25 mg


Magnesium Hydroxide (Milk Of Magnesia Liq*)  30 ml PO Q4H PRN


   PRN Reason: CONSTIPATION


Metoprolol Tartrate (Lopressor Tab*)  12.5 mg PO Q12HR [facility]


   Last Admin: 08/21/17 22:09 Dose:  Not Given


Morphine Sulfate (Morphine Inj (Syringe)*)  2 mg IV Q3H PRN


   PRN Reason: PAIN


   Last Admin: 08/21/17 19:43 Dose:  2 mg


Ondansetron HCl (Zofran Inj*)  4 mg IV Q6H PRN


   PRN Reason: NAUSEA/VOMITING


   Last Admin: 08/20/17 17:15 Dose:  4 mg


Oxycodone/Acetaminophen (Percocet 5/325 Tab*)  1 tab PO Q4H PRN


   PRN Reason: PAIN - MODERATE


Oxycodone/Acetaminophen (Percocet 5/325 Tab*)  2 tab PO Q4H PRN


   PRN Reason: PAIN - SEVERE


   Last Admin: 08/22/17 08:21 Dose:  2 tab


Pantoprazole Sodium (Protonix Tab (Nf))  40 mg PO DAILY [facility]


   Last Admin: 08/21/17 09:35 Dose:  40 mg








Vital Signs











  08/22/17 08/22/17





  07:28 08:21


 


Temperature 98.3 F 


 


Pulse Rate 88 


 


Respiratory 16 18





Rate  


 


Blood Pressure 106/46 





(mmHg)  


 


O2 Sat by Pulse 97 





Oximetry  











Oxygen Devices in Use Now: Nasal Cannula


Appearance: Elderly male lying in bed in NAD.


Eyes: No Scleral Icterus


Ears/Nose/Mouth/Throat: Mucous Membranes Moist


Neck: Trachea Midline


Respiratory: Symmetrical Chest Expansion and Respiratory Effort, Clear to 

Auscultation


Cardiovascular: RRR - Normal S1 and S2


Abdominal: NL Sounds; No Tenderness; No Distention


Extremities: - - Good capillary refill, sensation is intact


Neurological: Alert and Oriented x 3, NL Muscle Strength and Tone


Lines/Tubes/Other Access: Clean, Dry and Intact Peripheral IV


Nutrition: Taking PO's


Result Diagrams: 


 08/22/17 05:59





 08/22/17 05:59





Assess/Plan/Problems-Billing


Assessment: 


Mr. Tubbs is an 85yo M with PMH of CVA, HTN, HLD, depression, who 

presented to ED with c/o right hip pain after a mechanical fall, found to have 

right femoral neck fracture.





- Patient Problems


(1) Fracture of femoral neck, right, closed


Comment: - S/p right hemiarthroplasty done 08/20/17.


- Management as per Ortho.


- Continue PT/OT.   





(2) Acute blood loss anemia


Comment: - Suspect patient was hemoconcentrated on admission and had drop due 

to surgical blood loss.


- Continue to monitor H/H - will transfuse if Hb<7.0.   





(3) Thrombocytosis


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 

elevated since 2015.


- Probably higher now due to stress associated with fall and fracture.


- Patient not interested in further w/u, but can continue conversations as 

outpatient.   





(4) Hypertension


Comment: - Controlled.


- Continue Metoprolol.   





(5) Hyperlipidemia


Comment: - Continue statin.   





(6) H/O: CVA (cerebrovascular accident)


Comment: - Continue Plavix.   





(7) DNR (do not resuscitate)





Status and Disposition: 


Inpatient for surgical management of hip fracture. Anticipate d/c home with [facility] 

in AM.

## 2017-08-22 NOTE — PN
Progress Note





- Progress Note


Date of Service: 08/22/17


SOAP: 


Subjective:


Pt OOB to chair when complaints of right hip pain, pain controlled with current 

pain regimen








Objective:





 Vital Signs











Temp Pulse Resp BP Pulse Ox


 


 98.3 F   88   18   106/46   97 


 


 08/22/17 07:28  08/22/17 07:28  08/22/17 08:21  08/22/17 07:28  08/22/17 07:28








 Laboratory Last Values











WBC  8.6 10^3/ul (3.5-10.8)   08/22/17  05:59    


 


RBC  2.78 10^6/ul (4.0-5.4)  L  08/22/17  05:59    


 


Hgb  8.1 g/dl (14.0-18.0)  L  08/22/17  05:59    


 


Hct  24 % (42-52)  L  08/22/17  05:59    


 


MCV  86 fL (80-94)   08/22/17  05:59    


 


MCH  29 pg (27-31)   08/22/17  05:59    


 


MCHC  34 g/dl (31-36)   08/22/17  05:59    


 


RDW  15 % (10.5-15)   08/22/17  05:59    


 


Plt Count  618 10^3/ul (150-450)  H  08/22/17  05:59    


 


MPV  8 um3 (7.4-10.4)   08/22/17  05:59    


 


Neut % (Auto)  78.6 % (38-83)   08/22/17  05:59    


 


Lymph % (Auto)  8.7 % (25-47)  L  08/22/17  05:59    


 


Mono % (Auto)  9.6 % (1-9)  H  08/22/17  05:59    


 


Eos % (Auto)  2.5 % (0-6)   08/22/17  05:59    


 


Baso % (Auto)  0.6 % (0-2)   08/22/17  05:59    


 


Absolute Neuts (auto)  6.7 10^3/ul (1.5-7.7)   08/22/17  05:59    


 


Absolute Lymphs (auto)  0.7 10^3/ul (1.0-4.8)  L  08/22/17  05:59    


 


Absolute Monos (auto)  0.8 10^3/ul (0-0.8)   08/22/17  05:59    


 


Absolute Eos (auto)  0.2 10^3/ul (0-0.6)   08/22/17  05:59    


 


Absolute Basos (auto)  0.1 10^3/ul (0-0.2)   08/22/17  05:59    


 


Absolute Nucleated RBC  0 10^3/ul  08/22/17  05:59    


 


Nucleated RBC %  0   08/22/17  05:59    


 


INR (Anticoag Therapy)  0.98  (0.89-1.11)   08/19/17  15:36    


 


APTT  38.9 seconds (26.0-36.3)  H  08/19/17  15:36    


 


Sodium  135 mmol/L (133-145)  D 08/22/17  05:59    


 


Potassium  4.6 mmol/L (3.5-5.0)   08/22/17  05:59    


 


Chloride  102 mmol/L (101-111)   08/22/17  05:59    


 


Carbon Dioxide  30 mmol/L (22-32)   08/22/17  05:59    


 


Anion Gap  3 mmol/L (2-11)   08/22/17  05:59    


 


BUN  19 mg/dL (6-24)   08/22/17  05:59    


 


Creatinine  1.06 mg/dL (0.67-1.17)   08/22/17  05:59    


 


Est GFR ( Amer)  85.6  (>60)   08/22/17  05:59    


 


Est GFR (Non-Af Amer)  66.6  (>60)   08/22/17  05:59    


 


BUN/Creatinine Ratio  17.9  (8-20)   08/22/17  05:59    


 


Glucose  114 mg/dL ()  H  08/22/17  05:59    


 


Lactic Acid  1.5 mmol/L (0.5-2.0)   08/19/17  15:36    


 


Calcium  8.5 mg/dL (8.6-10.3)  L  08/22/17  05:59    


 


Total Bilirubin  1.00 mg/dL (0.2-1.0)   08/19/17  15:36    


 


AST  44 U/L (13-39)  H  08/19/17  15:36    


 


ALT  31 U/L (7-52)   08/19/17  15:36    


 


Alkaline Phosphatase  81 U/L ()   08/19/17  15:36    


 


Troponin I  0.03 ng/mL (<0.04)   08/19/17  15:36    


 


Total Protein  7.7 g/dL (6.4-8.9)   08/19/17  15:36    


 


Albumin  4.5 g/dL (3.2-5.2)   08/19/17  15:36    


 


Globulin  3.2 g/dL (2-4)   08/19/17  15:36    


 


Albumin/Globulin Ratio  1.4  (1-3)   08/19/17  15:36    


 


Urine Color  Yellow   08/20/17  00:40    


 


Urine Appearance  Clear   08/20/17  00:40    


 


Urine pH  6.0  (5-9)   08/20/17  00:40    


 


Ur Specific Gravity  1.025  (1.010-1.030)   08/20/17  00:40    


 


Urine Protein  1+(30 mg/dl)  (Negative)  H  08/20/17  00:40    


 


Urine Ketones  Negative  (Negative)   08/20/17  00:40    


 


Urine Blood  1+  (Negative)  H  08/20/17  00:40    


 


Urine Nitrate  Negative  (Negative)   08/20/17  00:40    


 


Urine Bilirubin  Negative  (Negative)   08/20/17  00:40    


 


Urine Urobilinogen  Negative  (Negative)   08/20/17  00:40    


 


Ur Leukocyte Esterase  Negative  (Negative)   08/20/17  00:40    


 


Urine WBC (Auto)  Trace(0-5/hpf)  (Absent)   08/20/17  00:40    


 


Urine RBC (Auto)  3+(>10/hpf)  (Absent)  H  08/20/17  00:40    


 


Urine Bacteria  Absent  (Absent)   08/20/17  00:40    


 


Urine Glucose  Negative  (Negative)   08/20/17  00:40    


 


Blood Type  O Positive   08/19/17  15:36    


 


Antibody Screen  Negative   08/19/17  15:36    








incision: c/d; dressing changed


P: NVI








Assessment:


S/P right hip luis daniel; POD#2








Plan:


1) Continue PT/OT- WBAT


2) Plavix/Lovenox for DVT prophylaxis


3) awaiting [facility] approval and transfer

## 2017-08-23 VITALS — SYSTOLIC BLOOD PRESSURE: 142 MMHG | DIASTOLIC BLOOD PRESSURE: 61 MMHG

## 2017-08-23 RX ADMIN — CLOPIDOGREL SCH MG: 75 TABLET, FILM COATED ORAL at 08:13

## 2017-08-23 RX ADMIN — Medication SCH MG: at 08:13

## 2017-08-23 RX ADMIN — ENOXAPARIN SODIUM SCH MG: 40 INJECTION SUBCUTANEOUS at 05:46

## 2017-08-23 RX ADMIN — CYANOCOBALAMIN TAB 500 MCG SCH MCG: 500 TAB at 08:13

## 2017-08-23 RX ADMIN — PANTOPRAZOLE SODIUM SCH MG: 40 TABLET, DELAYED RELEASE ORAL at 08:13

## 2017-08-23 RX ADMIN — METOPROLOL TARTRATE SCH MG: 25 TABLET, FILM COATED ORAL at 08:14

## 2017-08-23 NOTE — PN
Progress Note





- Progress Note


Date of Service: 08/23/17


SOAP: 


Subjective:


Pt is doing well.  Pain is controlled. Lying in bed comfortably. Denies CP, SOB

, fever, chills or calf pain.








Objective:


PE- WDWN M NAD, A&Ox3


RLE- dressing c/d/i, calf soft nontender, +PF/DF ankle, +2 DP pulse, SILT





 Vital Signs











Temp Pulse Resp BP Pulse Ox


 


 98.1 F   80   16   109/50   98 


 


 08/23/17 07:30  08/23/17 07:30  08/23/17 08:10  08/23/17 07:30  08/23/17 08:10




















Assessment:


S/P right hip luis daniel; POD#3








Plan:


Continue PT/OT- WBAT


Plavix/Lovenox for DVT prophylaxis


Cont pain control


Stable for DC from an ortho standpoint pending placement


Post hip precautions


Daily dressing changes with gauze and foam tape. OK to shower. Do not submerge 

wound


F/U with Dr. Head 10-14 days post op

## 2017-08-23 NOTE — DS
CC:  Dr. Nguyễn; Dr. Head; Amparo

 

DATE OF ADMISSION:  08/19/2017.

 

DATE OF DISCHARGE:  08/23/2017.

 

DISCHARGE DIAGNOSES:

1.  Right femoral neck fracture, status post hemiarthropathy on August 20th.

2.  Acute blood loss anemia secondary to surgery.

 

SECONDARY DIAGNOSES:

1.  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

2.  Hypertension.

3.  Hyperlipidemia.

4.  Depression.

5.  Status post appendectomy.

6.  Status post prostatectomy.

7.  Status post bilateral cataract surgery.

8.  Status post tonsillectomy.

 

MEDICATIONS:

1.  Acetaminophen 975 mg p.o. q.6 hours prn pain.

2.  Oxycodone/acetaminophen 5/325 one to two tablets p.o. q.4 hours prn pain MDD 8. Maximum total ac
etaminophen should not exceed 4000 mg.

3.  Atorvastatin 40 mg p.o. daily.

4.  Clopidogrel 75 mg p.o. daily.

5.  Cyanocobalamin 1000 mcg p.o. daily.

6.  Lovenox 40 mg subcutaneously daily for 4 weeks.

7.  Ferrous Gluconate 325 mg p.o. daily.

8.  Lamictal 125 mg p.o. at bedtime.

9.  Milk of Magnesia 30 ml p.o. q.4 hours prn constipation.

10. Metoprolol Tartrate 12.5 mg p.o. q.12 hours.

11. Fish oil 1000 mg p.o. daily.

12. Omeprazole 20 mg p.o. daily.

 

HOSPITAL COURSE:  Mr. Tubbs is an 84-year-old male with a past medical history as stated above 
who presented to the emergency room on August 19th after sustaining a mechanical fall on the day of 
admission.  He was trying to stand up from a chair and was using his cane, but his right leg gave ou
t and he fell to his right.  For more details about his presentation, I refer you to his history and
 physical.

 

In the emergency room, hip and pelvis x-ray showed a slightly angulated and impacted right femoral n
adán fracture.

 

The patient was admitted for further management.  He was seen in consultation by Orthopedics (Dr. El) and his impression was the patient presented with a right hip displaced femoral neck fracture
 and his recommendation was for right hip bipolar hemiarthropathy.  He was taken to the OR on August 20th where the above mentioned procedure was performed by Dr. Head.

 

Of note is the fact the patient was noted to have thrombocytosis.  He has had thrombocytosis at [facility] since 2015 and his numbers were higher on admission, likely secondary to the stress of fall and fr
acture.  His numbers are already trending down to his usual baseline.  I discussed with the patient 
the possibility of further work-up, but he has been very clear that he is not interested in any aggr
essive or invasive measures.  This could be further discussed later on with the patient and his fami
ly after he is done with his rehabilitation process to see if at that time he would be interested in
 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

 

The patient also had leukocytosis on admission, also thought to be secondary to stress.  This has be
en resolved.  He had a hemoglobin of 13 on admission, that is higher than his baseline and I believe
 he was likely a little hemoconcentrated. This has dropped to a hemoglobin of 8 after surgery second
chris to surgical blood loss, but he has been asymptomatic and has not required blood transfusions.

 

He had no other events on his postop.  He did well with physical therapy and was felt to be medicall
y stable for discharge today to [facility] [place] to continue his rehabilitation process.

 

PHYSICAL EXAMINATION: General:  The patient is a pleasant elderly male, sitting up in bed in no acut
e distress.  Vital Signs:  Temperature 98.1, heart rate 80, respiratory rate 16, oxygen saturation 9
8 percent on room air, blood pressure 109/50.  CVS: Normal S1, S2.  Regular rate and rhythm.  Chest:
  Breath sounds bilaterally with no added sounds.  Abdomen: Soft, bowel sounds are present.  Extremi
ties:  Clean dressing attached to his right hip.  The patient has no lower extremity edema.  Good ca
pillary refill.  Good pulses.  Sensation is intact.  He is able to move all four extremities.  Neuro
:  He alert, awake and oriented time two to self and place.

 

DIET:  Regular diet with soft texture.

 

ACTIVITIES:  Weightbearing as tolerated on the right lower extremities.  Continue PT and OT as jean carlos
ated.

 

DISPOSITION:  To [facility] [place].

 

STATUS WHILE IN THE HOSPITAL:  Inpatient.

 

Please keep in mind this is a summarized version of this patient's hospital stay. If you need more i
nformation, please feel free to call me at [phone] or please obtain the full medical records.

 

Approximately 45 minutes were spent to complete this discharge.

 

 [id number]/[facility] #: [id number]

## 2017-09-04 ENCOUNTER — HOSPITAL ENCOUNTER (INPATIENT)
Dept: HOSPITAL 25 - ED | Age: 82
LOS: 3 days | Discharge: HOME | DRG: 689 | End: 2017-09-07
Attending: INTERNAL MEDICINE | Admitting: INTERNAL MEDICINE
Payer: MEDICARE

## 2017-09-04 DIAGNOSIS — Z88.1: ICD-10-CM

## 2017-09-04 DIAGNOSIS — F32.9: ICD-10-CM

## 2017-09-04 DIAGNOSIS — E87.2: ICD-10-CM

## 2017-09-04 DIAGNOSIS — E78.5: ICD-10-CM

## 2017-09-04 DIAGNOSIS — Z82.3: ICD-10-CM

## 2017-09-04 DIAGNOSIS — I10: ICD-10-CM

## 2017-09-04 DIAGNOSIS — F31.9: ICD-10-CM

## 2017-09-04 DIAGNOSIS — Z79.1: ICD-10-CM

## 2017-09-04 DIAGNOSIS — Z79.899: ICD-10-CM

## 2017-09-04 DIAGNOSIS — Z96.641: ICD-10-CM

## 2017-09-04 DIAGNOSIS — Z88.8: ICD-10-CM

## 2017-09-04 DIAGNOSIS — I69.320: ICD-10-CM

## 2017-09-04 DIAGNOSIS — N39.0: Primary | ICD-10-CM

## 2017-09-04 DIAGNOSIS — F03.90: ICD-10-CM

## 2017-09-04 DIAGNOSIS — D47.3: ICD-10-CM

## 2017-09-04 DIAGNOSIS — B96.89: ICD-10-CM

## 2017-09-04 DIAGNOSIS — A41.9: ICD-10-CM

## 2017-09-04 DIAGNOSIS — Z87.891: ICD-10-CM

## 2017-09-04 LAB
ADD DIFF/SLIDE REVIEW?: (no result)
ALBUMIN SERPL BCG-MCNC: 3.7 G/DL (ref 3.2–5.2)
ALP SERPL-CCNC: 119 U/L (ref 34–104)
ALT SERPL W P-5'-P-CCNC: 19 U/L (ref 7–52)
ANION GAP SERPL CALC-SCNC: 8 MMOL/L (ref 2–11)
AST SERPL-CCNC: 28 U/L (ref 13–39)
BUN SERPL-MCNC: 26 MG/DL (ref 6–24)
BUN/CREAT SERPL: 21 (ref 8–20)
CALCIUM SERPL-MCNC: 9 MG/DL (ref 8.6–10.3)
CHLORIDE SERPL-SCNC: 101 MMOL/L (ref 101–111)
GLOBULIN SER CALC-MCNC: 2.7 G/DL (ref 2–4)
GLUCOSE SERPL-MCNC: 133 MG/DL (ref 70–100)
HCO3 SERPL-SCNC: 25 MMOL/L (ref 22–32)
HCT VFR BLD AUTO: 29 % (ref 42–52)
HGB BLD-MCNC: 9.4 G/DL (ref 14–18)
MAGNESIUM SERPL-MCNC: 2.1 MG/DL (ref 1.9–2.7)
MCH RBC QN AUTO: 28 PG (ref 27–31)
MCHC RBC AUTO-ENTMCNC: 32 G/DL (ref 31–36)
MCV RBC AUTO: 87 FL (ref 80–94)
POTASSIUM SERPL-SCNC: 3.9 MMOL/L (ref 3.5–5)
PROT SERPL-MCNC: 6.4 G/DL (ref 6.4–8.9)
RBC # BLD AUTO: 3.33 10^6/UL (ref 4–5.4)
SODIUM SERPL-SCNC: 134 MMOL/L (ref 133–145)
TROPONIN I SERPL-MCNC: 0.03 NG/ML (ref ?–0.04)
WBC # BLD AUTO: 21.7 10^3/UL (ref 3.5–10.8)

## 2017-09-04 PROCEDURE — 81015 MICROSCOPIC EXAM OF URINE: CPT

## 2017-09-04 PROCEDURE — 87186 SC STD MICRODIL/AGAR DIL: CPT

## 2017-09-04 PROCEDURE — 87040 BLOOD CULTURE FOR BACTERIA: CPT

## 2017-09-04 PROCEDURE — 80048 BASIC METABOLIC PNL TOTAL CA: CPT

## 2017-09-04 PROCEDURE — 87641 MR-STAPH DNA AMP PROBE: CPT

## 2017-09-04 PROCEDURE — 83605 ASSAY OF LACTIC ACID: CPT

## 2017-09-04 PROCEDURE — 93005 ELECTROCARDIOGRAM TRACING: CPT

## 2017-09-04 PROCEDURE — 80053 COMPREHEN METABOLIC PANEL: CPT

## 2017-09-04 PROCEDURE — 83735 ASSAY OF MAGNESIUM: CPT

## 2017-09-04 PROCEDURE — 71010: CPT

## 2017-09-04 PROCEDURE — 81003 URINALYSIS AUTO W/O SCOPE: CPT

## 2017-09-04 PROCEDURE — 87077 CULTURE AEROBIC IDENTIFY: CPT

## 2017-09-04 PROCEDURE — 84484 ASSAY OF TROPONIN QUANT: CPT

## 2017-09-04 PROCEDURE — 85025 COMPLETE CBC W/AUTO DIFF WBC: CPT

## 2017-09-04 PROCEDURE — 87086 URINE CULTURE/COLONY COUNT: CPT

## 2017-09-04 PROCEDURE — 36415 COLL VENOUS BLD VENIPUNCTURE: CPT

## 2017-09-04 NOTE — ED
Luh SMYTH Rebecca, scribed for Buddy Fitzpatrick MD on 09/04/17 at 2152 .





Neurological HPI





- HPI Summary


HPI Summary: 


Pt is an 83 y/o M BIBA from [place] who presents to ED with concerns of the 

pt being less talkative and having possible seizure, per EMS. EMS report that 

their staff was concerned that he is conversing less often and not as well as 

his baseline and that he had "maybe seizure activity." Pt being less talkative 

has been present all evening today. At approximately 2030 this afternoon (30 

minutes PTA) the staff reported to EMS that he had "possible shaking, just not 

talking to her well and acting well." Pt denies any pain. En route to [facility] ED, 

pt had good O2 saturation on RA.





- History of Current Complaint


Chief Complaint: EDGeneral


Stated Complaint: SEIZURES


Hx Obtained From: Patient, EMS


Onset/Duration: Resolved - 1 episode


Number of Seizures: 1 - Possible seizure activity


Pain Intensity: 0


Pain Scale Used: 0-10 Numeric


Frequency: Episodes x___ - 1


Aggravating: Nothing


Alleviating: Nothing





- Additional Pertinent History


Primary Care Physician: [id number]





- Allergy/Home Medications


Allergies/Adverse Reactions: 


 Allergies











Allergy/AdvReac Type Severity Reaction Status Date / Time


 


Amoxicillin Allergy  Unknown Verified 10/08/16 18:37





   Reaction  





   Details  


 


Celecoxib [From Celebrex] Allergy  Unknown Verified 10/08/16 18:37





   Reaction  





   Details  














PMH/Surg Hx/FS Hx/Imm Hx


Endocrine/Hematology History: Reports: Hx Anemia


Cardiovascular History: Reports: Hx Angina, Hx Hypercholesterolemia, Hx 

Hypertension


   Denies: Hx Pacemaker/ICD


GI History: Reports: Hx Hiatal Hernia


Musculoskeletal History: Reports: Hx Arthritis - right knee, Hx Back Problems - 

lower back pain, Other Musculoskeletal History - osteoarthritis


Sensory History: Reports: Hx Cataracts - bilat cataract extraction, Hx Contacts 

or Glasses - not with pt, Hx Hearing Problem - [place], does not wear hearing aides


   Denies: Hx Hearing Aid


Opthamlomology History: Reports: Hx Cataracts - bilat cataract extraction, Hx 

Contacts or Glasses - not with pt


Neurological History: Reports: Other Neuro Impairments/Disorders - expressive 

aphasia


Psychiatric History: Reports: Hx Depression, Hx Bipolar Disorder, Hx Substance 

Abuse - marijuana and acid, Other Psychiatric Issues/Disorders - bipolar 

depression


   Denies: Hx Panic Disorder





- Cancer History


Cancer Type, Location and Year: Prostate Cancer





- Surgical History


Surgery Procedure, Year, and Place: Prostatectomy, appendix removed as a child,

  LEFT FOOT WITH METAL PIN


Hx Anesthesia Reactions: No


Infectious Disease History: No


Infectious Disease History: 


   Denies: Hx of Known/Suspected MRSA, Traveled Outside the [place] in Last 30 Days





- Family History


Known Family History: Positive: Cardiac Disease, Diabetes





- Social History


Alcohol Use: Rare


Substance Use Type: Reports: Marijuana


Substance Use Comment - Amount & Last Used: 5-6 TIMES A WEEK IN PM,2HITS


Smoking Status (MU): Light Every Day Tobacco Smoker


Type: Pipe


Length of Time of Smoking/Using Tobacco: PIPE 1-2 TIMES A DAY, DOES NOT INHALE


Have You Smoked in the Last Year: Yes - PIPE ONLY, ONCE OR TWICE A DAY





Review of Systems


Positive: Other - NEGATIVE: any pain


Neurological: Other - Possible serizure activity, less conversive


All Other Systems Reviewed And Are Negative: Yes





Physical Exam


Triage Information Reviewed: Yes


Vital Signs On Initial Exam: 


 Initial Vitals











BP


 


 128/70 


 


 09/04/17 21:14











Vital Signs Reviewed: Yes


Appearance: Positive: No Pain Distress, Thin


Skin: Positive: Warm


Head/Face: Positive: Normal Head/Face Inspection


Eyes: Positive: JESICA


ENT: Positive: Hearing grossly normal


Neck: Positive: Supple


Respiratory/Lung Sounds: Positive: Clear to Auscultation, Breath Sounds Present


Cardiovascular: Positive: RRR


Abdomen Description: Positive: Nontender, Soft


Bowel Sounds: Positive: Present


Musculoskeletal: Positive: Strength/ROM Intact


Neurological: Positive: Sensory/Motor Intact





- [place] Coma Scale


Coma Scale Total: 14





Diagnostics





- Vital Signs


 Vital Signs











  Temp Pulse Resp BP Pulse Ox


 


 09/04/17 21:24  99.9 F  107  18  128/70  96


 


 09/04/17 21:17   108  22   97


 


 09/04/17 21:14     128/70 














- Laboratory


Result Diagrams: 


 09/04/17 21:56





 09/04/17 21:56


Lab Statement: Any lab studies that have been ordered have been reviewed, and 

results considered in the medical decision making process.





Course/Dx





- Course


Assessment/Plan: Pt is an 83 y/o M BIBA from [place] who presents to ED with 

concerns of the pt being less talkative and having possible seizure, per EMS. 

EMS report that their staff was concerned that he is conversing less often and 

not as well as his baseline and that he had "maybe seizure activity." Pt being 

less talkative has been present all evening today. At approximately 2030 this 

afternoon (30 minutes PTA) the staff reported to EMS that he had "possible 

shaking, just not talking to her well and acting well." Pt denies any pain. En 

route to [facility] ED, pt had good O2 saturation on RA.  WBC of 21.7, lactic acid of 

4.0, troponin of 0.03. Discussed care of pt with Dr. Valeriano Cobb who accepts 

pt for admissions. He will be admitted with Dx of UTI. He understands and 

agrees.





- Diagnoses


Provider Diagnoses: 


 UTI (urinary tract infection)








- Physician Notifications


Discussed Care Of Patient With: Valeriano Cobb


Time Discussed With Above Provider: 00:15


Instructed by Provider To: Admit As Inpatient - Accepts pt for admission





Discharge





- Discharge Plan


Condition: Fair


Disposition: ADMITTED TO [facility]





The documentation as recorded by the Luh desouza Rebecca accurately 

reflects the service I personally performed and the decisions made by me, 

Buddy Fitzpatrick MD.

## 2017-09-05 LAB
ANION GAP SERPL CALC-SCNC: 3 MMOL/L (ref 2–11)
BUN SERPL-MCNC: 21 MG/DL (ref 6–24)
BUN/CREAT SERPL: 24.7 (ref 8–20)
CALCIUM SERPL-MCNC: 6.8 MG/DL (ref 8.6–10.3)
CHLORIDE SERPL-SCNC: 112 MMOL/L (ref 101–111)
GLUCOSE SERPL-MCNC: 88 MG/DL (ref 70–100)
HCO3 SERPL-SCNC: 23 MMOL/L (ref 22–32)
HCT VFR BLD AUTO: 23 % (ref 42–52)
HGB BLD-MCNC: 7.6 G/DL (ref 14–18)
MCH RBC QN AUTO: 29 PG (ref 27–31)
MCHC RBC AUTO-ENTMCNC: 33 G/DL (ref 31–36)
MCV RBC AUTO: 87 FL (ref 80–94)
POTASSIUM SERPL-SCNC: 3.4 MMOL/L (ref 3.5–5)
RBC # BLD AUTO: 2.65 10^6/UL (ref 4–5.4)
SODIUM SERPL-SCNC: 138 MMOL/L (ref 133–145)
WBC # BLD AUTO: 14 10^3/UL (ref 3.5–10.8)

## 2017-09-05 RX ADMIN — ATORVASTATIN CALCIUM SCH MG: 40 TABLET, FILM COATED ORAL at 17:24

## 2017-09-05 RX ADMIN — Medication SCH MG: at 09:00

## 2017-09-05 RX ADMIN — METOPROLOL TARTRATE SCH MG: 25 TABLET, FILM COATED ORAL at 09:00

## 2017-09-05 RX ADMIN — LAMOTRIGINE SCH MG: 100 TABLET ORAL at 21:17

## 2017-09-05 RX ADMIN — PANTOPRAZOLE SODIUM SCH MG: 40 TABLET, DELAYED RELEASE ORAL at 09:00

## 2017-09-05 RX ADMIN — OXYCODONE HYDROCHLORIDE AND ACETAMINOPHEN PRN TAB: 5; 325 TABLET ORAL at 10:30

## 2017-09-05 RX ADMIN — LAMOTRIGINE SCH MG: 25 TABLET ORAL at 21:17

## 2017-09-05 RX ADMIN — OXYCODONE HYDROCHLORIDE AND ACETAMINOPHEN PRN TAB: 5; 325 TABLET ORAL at 21:16

## 2017-09-05 RX ADMIN — CYANOCOBALAMIN TAB 500 MCG SCH MCG: 500 TAB at 08:59

## 2017-09-05 RX ADMIN — ENOXAPARIN SODIUM SCH MG: 40 INJECTION SUBCUTANEOUS at 08:59

## 2017-09-05 RX ADMIN — METOPROLOL TARTRATE SCH MG: 25 TABLET, FILM COATED ORAL at 21:17

## 2017-09-05 RX ADMIN — CLOPIDOGREL SCH MG: 75 TABLET, FILM COATED ORAL at 09:00

## 2017-09-05 NOTE — HP
ADMISSION HISTORY AND PHYSICAL:

 

DATE OF ADMISSION:  09/05/17

 

PRIMARY CARE PROVIDER:  Dr. Nguyễn.

 

HEALTHCARE PROXY:  Son.

 

CODE STATUS:  DNR.  MOLST updated.

 

SOURCE OF INFORMATION:  History obtained from interview with the sons, review 
of EMS transfer records.

 

RELIABILITY:  Fair.

 

CHIEF COMPLAINT:  Seizure-like activity.

 

HISTORY OF PRESENT ILLNESS:  This is an 84-year-old man, recent right 
hemiarthroplasty on 08/20/17 with hospital stay complicated by blood loss 
anemia in the setting of surgery.  Stay lasted from 08/19/17 to 08/23/17 after 
which time he was discharged to [facility].  The sons see him daily, last seen 
this morning acting himself.  Status post a CVA in 2016.  The patient had a 
significant expressive aphasia as well as progressive dementia and it is 
difficult to communicate with him, although the sons do indicate they were able 
to have some meaningful communication and recognized some of the words that he 
is trying to say, which mostly consist of nouns.  Son indicates that over the 
last couple of days, the patient has been complaining of an inability to urinate
, although still has been voiding at [facility].  He complained of constipation, 
status post procedure.  He has had no coughing, shortness of breath, chest pain
, nausea, vomiting.  He had decreased appetite at [facility], although it is 
noted because he dislikes the food and the sons have been bringing him food.  
Over the last 2 to 3 days, the patient has been telling his sons that "
something is wrong" and he did not feel right. Today, the patient's sons were 
coming to visit and they found him being loaded into an ambulance.  Review of 
EMS records indicate that the nurse witnessed "small seizure-like activity."  
Of note, the patient's sons indicate the patient has significant expressive 
aphasia at baseline; however, the nursing at [facility] indicated to EMS that at 
baseline, he speaks clearly and walks around the facility. For this reason, I 
question  some of the validity of the nurses' reports of surrounding event.  
When seen by this author, the sons think the patient is close to his baseline 
and his inability to communicate meaningfully with this author.  In the 
emergency room, there was difficulty placing a Bates catheter or a straight 
catheter to obtain a urinary sample.  The patient did void, was noted to be 
foul smelling.  A postvoid residual was 150 cc.

 

PAST MEDICAL HISTORY:  Includes a history of left posterior MCA, CVA in October 2016, hypertension, hyperlipidemia, depression, thrombocytosis, bipolar 
disorder.

 

PAST SURGICAL HISTORY:  Recent right hemiarthroplasty 08/20/17, appendectomy, 
prostatectomy, bilateral cataract extraction, tonsillectomy.

 

HOME MEDICATIONS:

1.  Percocet 5/325 one to two tabs every 4 hours as needed for pain.

2.  Lamictal 125 mg at bedtime.

3.  Omeprazole 20 mg daily.

4.  Fish oil 1000 mg daily.

5.  Metoprolol tartrate 12.5 mg twice daily.

6.  Magnesium hydroxide liquid 30 mL every 4 hours as needed for constipation.

7.  Ferrous gluconate 324 mg daily.

8.  Lovenox 40 mg daily, status post hip surgery.

9.  Vitamin B12 1000 mcg daily.

10.  Clopidogrel 75 mg daily.

11.  Lipitor 40 mg in the evening.

12.  Acetaminophen 975 mg every 6 hours as needed for pain.

 

ALLERGIES:  To AMOXICILLIN and CELECOXIB.  Allergy to AMOXICILLIN is unknown, 
could not be confirmed by his sons.

 

FAMILY HISTORY:  Includes CVA in his father.

 

SOCIAL HISTORY:  Former tobacco, quit over 40 years prior.  Rare alcohol.  
Retired .  Has 3 children.  Son, Eligio, is the designated 
healthcare proxy.

 

REVIEW OF SYSTEMS:  Difficult to obtain from the patient given his expressive 
aphasia.  Does include sensation of feeling something is wrong as well as 
difficulty urinating and decreased appetite.  Otherwise, unable to obtain.

 

                               PHYSICAL EXAMINATION

 

GENERAL:  An elderly man, sitting up 45 degrees in bed, temporal wasting.

 

VITAL SIGNS:  In the emergency room, 133/64, heart rate 95, respiratory rate is 
18, 98% on room air, T-max 99.9.

 

HEENT:  His oropharynx is clear.  He has dry mucous membranes.  Sclerae are 
anicteric.

 

NECK:  He has non-elevated JVD.  No cervical or supraclavicular lymphadenopathy.

 

LUNGS:  Clear to auscultation.

 

HEART:  Soft, early peak and systolic ejection murmur.

 

ABDOMEN:  Soft, nontender.

 

EXTREMITIES:  Warm, well perfused without clubbing, cyanosis or edema.  He has 
a clean incision site without hematoma in his right hip or evidence of 
infection.

 

NEUROLOGIC:  He has expressive aphasia, follows simple commands such as open 
his mouth.  Unable to communicate meaningfully.  No apparent anxiety, agitation
, or depression.

 

 DIAGNOSTIC STUDIES/LAB DATA:  Pertinent labs reviewed.  White blood cell count 
21.7, which is 93.5% neutrophils, hemoglobin 9.4, platelets 1136.  BUN 26, 
creatinine 1.24, lactic acid 4.0.  Data reviewed.  Chest x-ray, no active 
cardiopulmonary disease on this author's read.

 

ASSESSMENT AND PLAN:  This is an 84-year-old man with past medical history as 
indicated above including cerebrovascular accident in 2016 complicated by 
persistent expressive aphasia, dementia, bipolar disorder, presenting with 
seizure- like activity/change in mental status, associated with foul smelling 
urine and urinary hesitancy, found with leukocytosis, tachycardia, meeting 
sepsis criteria.

 

1.  Sepsis, suspect from urinary source.  Check blood cultures.  I have failed 
to obtain urine with 2 attempts at straight catheterization.  We will attempt 
again. Currently, not retaining urine.  Start ceftriaxone, can broaden if 
needed.  One liter normal saline bolus now and continue 150 cc normal saline.  
Check blood cultures prior to administration of antibiotics.

2.  Thrombocytosis.  The patient has baseline thrombocytosis; however, worsens 
in the setting of suspected infection.  Continue to monitor.  He is already 
anticoagulated with prophylaxis Lovenox in the setting of hospital stay and 
recent surgery.

3.  Lactic acidosis in the setting of suspected infection.  Repeat now.  Has 
not yet received any crystalloid replacement, will recheck lactic acidosis 
regardless of current value in the morning.

4.  Urinary hesitancy.  Bladder scan indicates 150 cc of urine.  Continue to 
trend. Monitor for retention.

5.  Bipolar disorder.  Continue Lamictal.  No history of seizures per sons and 
they indicate that Lamictal was for history of bipolar disorder.

6.  Recent hip surgery.  Attention to discharge with Lovenox should be made.  
The patients typically receive 30 to 35 days at least of Lovenox, status post 
discharge.  His surgery was on the 20th.  Therefore, _last day at least until 09
/20/17.

7.  Code status.  DNR.  MOLST updated.

 

[id number]/[facility] #: [id number]

[facility]

## 2017-09-05 NOTE — RAD
INDICATION:  Fever.



COMPARISON:  Comparison is made with a prior study from August 19, 2017.



TECHNIQUE: A portable view of the chest was obtained.



FINDINGS: Cardiac and mediastinal contours appear to be within normal limits.



The lungs are underinflated and clear. No pleural effusion is seen.



IMPRESSION:  NO EVIDENCE FOR ACUTE DISEASE.

## 2017-09-06 LAB
ANION GAP SERPL CALC-SCNC: 5 MMOL/L (ref 2–11)
BUN SERPL-MCNC: 21 MG/DL (ref 6–24)
BUN/CREAT SERPL: 20.2 (ref 8–20)
CALCIUM SERPL-MCNC: 8.7 MG/DL (ref 8.6–10.3)
CHLORIDE SERPL-SCNC: 105 MMOL/L (ref 101–111)
GLUCOSE SERPL-MCNC: 96 MG/DL (ref 70–100)
HCO3 SERPL-SCNC: 26 MMOL/L (ref 22–32)
HCT VFR BLD AUTO: 25 % (ref 42–52)
HGB BLD-MCNC: 8.4 G/DL (ref 14–18)
MCH RBC QN AUTO: 28 PG (ref 27–31)
MCHC RBC AUTO-ENTMCNC: 33 G/DL (ref 31–36)
MCV RBC AUTO: 86 FL (ref 80–94)
POTASSIUM SERPL-SCNC: 4.3 MMOL/L (ref 3.5–5)
RBC # BLD AUTO: 2.95 10^6/UL (ref 4–5.4)
SODIUM SERPL-SCNC: 136 MMOL/L (ref 133–145)
WBC # BLD AUTO: 11.5 10^3/UL (ref 3.5–10.8)

## 2017-09-06 RX ADMIN — ENOXAPARIN SODIUM SCH MG: 40 INJECTION SUBCUTANEOUS at 08:50

## 2017-09-06 RX ADMIN — CLOPIDOGREL SCH MG: 75 TABLET, FILM COATED ORAL at 08:50

## 2017-09-06 RX ADMIN — Medication SCH MG: at 08:50

## 2017-09-06 RX ADMIN — ATORVASTATIN CALCIUM SCH MG: 40 TABLET, FILM COATED ORAL at 17:00

## 2017-09-06 RX ADMIN — PANTOPRAZOLE SODIUM SCH MG: 40 TABLET, DELAYED RELEASE ORAL at 08:50

## 2017-09-06 RX ADMIN — METOPROLOL TARTRATE SCH MG: 25 TABLET, FILM COATED ORAL at 21:43

## 2017-09-06 RX ADMIN — CEFTRIAXONE SODIUM SCH MLS/HR: 1 INJECTION, POWDER, FOR SOLUTION INTRAMUSCULAR; INTRAVENOUS at 01:35

## 2017-09-06 RX ADMIN — METOPROLOL TARTRATE SCH MG: 25 TABLET, FILM COATED ORAL at 08:50

## 2017-09-06 RX ADMIN — LAMOTRIGINE SCH MG: 25 TABLET ORAL at 21:44

## 2017-09-06 RX ADMIN — LAMOTRIGINE SCH MG: 100 TABLET ORAL at 21:44

## 2017-09-06 RX ADMIN — CYANOCOBALAMIN TAB 500 MCG SCH MCG: 500 TAB at 08:50

## 2017-09-06 NOTE — PN
Subjective


Date of Service: 09/06/17


Interval History: 





No events.  Feels good this morning.  He denies any pain.  Review of systems is 

limited by aphasia and dementia, but he does deny dysuria, nausea, diarrhea.


Family History: Unchanged from Admission


Social History: Unchanged from Admission


Past Medical History: Unchanged from Admission





Objective


Active Medications: 








Acetaminophen (Tylenol Tab*)  975 mg PO Q6HR PRN


   PRN Reason: mild pain/fever


Atorvastatin Calcium (Lipitor*)  40 mg PO 1700 [facility]


   Last Admin: 09/06/17 17:00 Dose:  40 mg


Clopidogrel Bisulfate (Plavix Tab*)  75 mg PO DAILY [facility]


   Last Admin: 09/06/17 08:50 Dose:  75 mg


Cyanocobalamin (Vitamin B12 Tab*)  1,000 mcg PO DAILY [facility]


   Last Admin: 09/06/17 08:50 Dose:  1,000 mcg


Enoxaparin Sodium (Lovenox(*))  40 mg SUBCUT Q24H [facility]


   Last Admin: 09/06/17 08:50 Dose:  40 mg


Ferrous Gluconate (Fergon Tab*)  324 mg PO DAILY [facility]


   Last Admin: 09/06/17 08:50 Dose:  324 mg


Ceftriaxone Sodium 1,000 mg/ (Sodium Chloride)  50 mls @ 200 mls/hr IVPB Q24H 

[facility]


   Last Admin: 09/06/17 01:35 Dose:  200 mls/hr


Lamotrigine (Lamictal Tab(*))  25 mg PO BEDTIME [facility]


   Last Admin: 09/05/17 21:17 Dose:  25 mg


Lamotrigine (Lamictal Tab(*))  100 mg PO BEDTIME [facility]


   Last Admin: 09/05/17 21:17 Dose:  100 mg


Magnesium Hydroxide (Milk Of Magnesia Liq*)  30 ml PO Q4H PRN


   PRN Reason: CONSTIPATION


Metoprolol Tartrate (Lopressor Tab*)  12.5 mg PO Q12HR [facility]


   Last Admin: 09/06/17 08:50 Dose:  12.5 mg


Ondansetron HCl (Zofran Inj*)  4 mg IV Q4H PRN


   PRN Reason: NAUSEA/VOMITING


Oxycodone/Acetaminophen (Percocet 5/325 Tab*)  1 tab PO Q4H PRN


   PRN Reason: Moderate to severe pain


   Last Admin: 09/05/17 21:16 Dose:  1 tab


Pantoprazole Sodium (Protonix Tab (Nf))  40 mg PO DAILY [facility]


   Last Admin: 09/06/17 08:50 Dose:  40 mg








 Vital Signs











  09/05/17 09/05/17 09/05/17





  19:33 21:16 22:00


 


Temperature 97.7 F  


 


Pulse Rate 85  


 


Respiratory 20 18 18





Rate   


 


Blood Pressure 133/56  





(mmHg)   


 


O2 Sat by Pulse 100  





Oximetry   














  09/05/17 09/05/17 09/06/17





  23:16 23:25 00:43


 


Temperature  97.9 F 


 


Pulse Rate  81 


 


Respiratory 18 16 18





Rate   


 


Blood Pressure  119/49 





(mmHg)   


 


O2 Sat by Pulse  100 





Oximetry   














  09/06/17 09/06/17 09/06/17





  03:52 07:51 07:57


 


Temperature 97.9 F 98.3 F 


 


Pulse Rate 77 84 


 


Respiratory 16 18 18





Rate   


 


Blood Pressure 128/64 128/58 





(mmHg)   


 


O2 Sat by Pulse 99 98 





Oximetry   














  09/06/17 09/06/17 09/06/17





  14:30 14:52 15:49


 


Temperature  97.9 F 98.3 F


 


Pulse Rate 75  81


 


Respiratory  18 18





Rate   


 


Blood Pressure 123/56  112/56





(mmHg)   


 


O2 Sat by Pulse 100  100





Oximetry   











Oxygen Devices in Use Now: None


Appearance: alert, thin, no distress


Eyes: No Scleral Icterus, PERRLA


Ears/Nose/Mouth/Throat: NL Teeth, Lips, Gums, Clear Oropharnyx


Neck: NL Appearance and Movements; NL JVP, Trachea Midline


Respiratory: Symmetrical Chest Expansion and Respiratory Effort, Clear to 

Auscultation


Cardiovascular: NL Sounds; No Murmurs; No JVD, RRR, No Edema


Abdominal: NL Sounds; No Tenderness; No Distention, No Hepatosplenomegaly, - - 

no cva tenderness


Lymphatic: No Cervical Adenopathy


Extremities: No Edema, - - staples in right hip; incision clean and dry 


Neurological: - - expressive aphasia, able to name simple objects, answer yes 

and no appropriately 


Result Diagrams: 


 09/06/17 05:40





 09/06/17 05:40


Microbiology and Other Data: 


 Microbiology











 09/05/17 02:47 Urine Culture - Preliminary





 Urine    Providencia Rettgeri


 


 09/05/17 00:51 Aerobic Blood Culture - Preliminary





 Blood Venous    No Growth Day 1





 Anaerobic Blood Culture - Preliminary





    No Growth Day 1


 


 09/05/17 00:48 Aerobic Blood Culture - Preliminary





 Blood Venous    No Growth Day 1





 Anaerobic Blood Culture - Preliminary





    No Growth Day 1


 


 09/05/17 01:20 Nasal Screen MRSA (PCR)([facility]) - Final





 Nasal    Mrsa Negative














Assess/Plan/Problems-Billing


Assessment: 





1. Complicated UTI 


Stable on ceftriaxone.  U culture growing providencia; awaiting sensitivities. 





2. Disposition. 


Mr. Tubbs's family requests that he not go back to [facility]; PT has been 

consulted to assist with dispo options, awaiting assessment.  





3. Recent hip arthroplasty 


Needs follow up with Dr. Olson for staple removal.  





4. CVA


continue plavix/statin





5.  Thrombocytosis


Continue plavix

## 2017-09-07 VITALS — DIASTOLIC BLOOD PRESSURE: 56 MMHG | SYSTOLIC BLOOD PRESSURE: 105 MMHG

## 2017-09-07 LAB
HCT VFR BLD AUTO: 27 % (ref 42–52)
HGB BLD-MCNC: 9.1 G/DL (ref 14–18)
MCH RBC QN AUTO: 29 PG (ref 27–31)
MCHC RBC AUTO-ENTMCNC: 34 G/DL (ref 31–36)
MCV RBC AUTO: 86 FL (ref 80–94)
RBC # BLD AUTO: 3.17 10^6/UL (ref 4–5.4)
WBC # BLD AUTO: 11.3 10^3/UL (ref 3.5–10.8)

## 2017-09-07 RX ADMIN — CYANOCOBALAMIN TAB 500 MCG SCH MCG: 500 TAB at 09:25

## 2017-09-07 RX ADMIN — CLOPIDOGREL SCH MG: 75 TABLET, FILM COATED ORAL at 09:25

## 2017-09-07 RX ADMIN — CEFTRIAXONE SODIUM SCH MLS/HR: 1 INJECTION, POWDER, FOR SOLUTION INTRAMUSCULAR; INTRAVENOUS at 01:52

## 2017-09-07 RX ADMIN — METOPROLOL TARTRATE SCH MG: 25 TABLET, FILM COATED ORAL at 09:26

## 2017-09-07 RX ADMIN — ENOXAPARIN SODIUM SCH MG: 40 INJECTION SUBCUTANEOUS at 09:27

## 2017-09-07 RX ADMIN — PANTOPRAZOLE SODIUM SCH MG: 40 TABLET, DELAYED RELEASE ORAL at 09:27

## 2017-09-07 RX ADMIN — Medication SCH MG: at 09:26

## 2017-09-08 NOTE — DS
CC:  Dr. David Nguyễn; Dr. Head *

 

DISCHARGE SUMMARY:

 

DATE OF ADMISSION:  09/05/17

 

DATE OF DISCHARGE:  09/07/17

 

PRIMARY CARE PROVIDER:  Dr. David Nguyễn.

 

DISCHARGING PROVIDER:  DYLLAN Thorpe

 

SUPERVISING PHYSICIAN:  Dr. Chloé Garcia * (DICTATED BY DYLLAN THORPE)

 

PRIMARY DISCHARGE DIAGNOSES:

1.  Urinary tract infection, complicated, urine culture grew providencia.

2.  Recent hip fracture, status post hemiarthroplasty with Dr. Head, 08/20/
17.

3.  History of cerebrovascular accident and associated expressive aphasia.

4.  Thrombocytosis, which is chronic.

 

DISCHARGE MEDICATIONS:

1.  Acetaminophen 975 mg p.o. q.6 hours as needed for pain or fever.

2.  Eliquis 2.5 mg p.o. b.i.d. x14 days.

3.  Atorvastatin 40 mg p.o. daily.

4.  Plavix 75 mg p.o. daily.

5.  Vitamin B12 1000 mcg p.o. daily.

6.  Ferrous gluconate 324 mg p.o. daily.

7.  Magnesium hydroxide 30 mL p.o. q.4 hours as needed for constipation.

8.  Metoprolol tartrate 12.5 mg p.o. q.12 hours.

9.  Fish oil 1000 mg p.o. daily.

10.  Omeprazole 20 mg p.o. daily.

11.  Ceftin 250 mg p.o. b.i.d. x7 days.

12.  Lamictal 125 mg p.o. at bedtime.

13.  Percocet 5/325 one to two tablets p.o. q.4 hours as needed for pain.

 

Medication changes:

1.  Stop Lovenox.

2.  Start Eliquis.

3.  Ceftin x7 days.

 

HOSPITAL IMAGING:  Chest x-ray shows no acute process.

 

HOSPITAL COURSE:  This is an 84-year-old gentleman with history of prior CVA 
and associated expressive aphasia, who was recently treated for hip fracture, 
who underwent hemiarthroplasty on 08/20/17 with Dr. Head, who presented to 
the emergency department with complaints of  shaking.  The patient was noted to 
have a significant leukocytosis with a white blood cell count of 21,000 as well 
as significant thrombocytosis with platelet count of 1136.  Lactic acid was 
elevated at 4.0 and evidence of mild acute kidney injury with a creatinine of 
1.24 with a baseline closer to 1.0.  Urinalysis showed positive nitrites, leuk 
esterase, white blood cells, red blood cells, and bacteria.  The patient was 
subsequently admitted with concern for urinary tract infection and empirically 
treated with ceftriaxone.

 

Maximum temperature during the hospital stay was 100.2 degrees Fahrenheit, the 
night of admission.  The patient remained afebrile throughout the remainder of 
his stay.  His white blood cell count trended down as did his platelet count 
and his complaints of shaking resolved completely.  Urine culture eventually 
grew providencia, but only 10,000 to 20,000 colonies and was resistant to 
ampicillin, cefazolin, nitrofurantoin, and tetracycline, but sensitivity to 
ceftriaxone that he had been treated on initially.  Blood cultures remained 
negative.

 

The patient was evaluated by Physical Therapy during his hospital stay and he 
appears to have met his therapy treatment goals.  He had been receiving rehab 
at [facility] prior to this admission following his hip fracture last month and 
wished not to return there.  Functional status seemed to be such that he could 
return home where he lives with his son and continue home physical therapy 
services.

 

I contacted patient's orthopedic surgeon, Dr. Head, during his hospital stay
, who wished to see him in followup following discharge for staple removal and 
repeat hip x-ray.

 

DISPOSITION AND FOLLOWUP PLAN:  The patient is being discharged to home where 
he lives with his son, Valeriano.  He required 7 additional days of antibiotics 
as described above.  He has a 2:45 appointment with Dr. Head this afternoon 
with plans for staple removal and repeat hip x-ray at that time.  The patient 
has been referred to visiting nurse service and will resume physical therapy 
via home PT with plans to graduate to an outpatient service.  Additional 
medication changes made as outlined above.  The patient will continue on 
Eliquis for an additional 2 weeks for DVT prophylaxis following his recent 
hemiarthroplasty.

 

____________________________________ DYLLAN THORPE

 

[id number]/[facility] #: [id number]

[facility]

## 2018-01-07 ENCOUNTER — HOSPITAL ENCOUNTER (OUTPATIENT)
Dept: HOSPITAL 25 - ED | Age: 83
Setting detail: OBSERVATION
LOS: 2 days | Discharge: HOME | End: 2018-01-09
Attending: HOSPITALIST | Admitting: HOSPITALIST
Payer: MEDICARE

## 2018-01-07 DIAGNOSIS — D61.818: ICD-10-CM

## 2018-01-07 DIAGNOSIS — F17.210: ICD-10-CM

## 2018-01-07 DIAGNOSIS — R56.9: Primary | ICD-10-CM

## 2018-01-07 LAB
BASOPHILS # BLD AUTO: 0 10^3/UL (ref 0–0.2)
EOSINOPHIL # BLD AUTO: 0 10^3/UL (ref 0–0.6)
HCT VFR BLD AUTO: 30 % (ref 42–52)
HGB BLD-MCNC: 10.4 G/DL (ref 14–18)
INR PPP/BLD: 0.96 (ref 0.77–1.02)
LYMPHOCYTES # BLD AUTO: 0.3 10^3/UL (ref 1–4.8)
MCH RBC QN AUTO: 36 PG (ref 27–31)
MCHC RBC AUTO-ENTMCNC: 35 G/DL (ref 31–36)
MCV RBC AUTO: 104 FL (ref 80–94)
MONOCYTES # BLD AUTO: 0.1 10^3/UL (ref 0–0.8)
NEUTROPHILS # BLD AUTO: 2.4 10^3/UL (ref 1.5–7.7)
NRBC # BLD AUTO: 0 10^3/UL
NRBC BLD QL AUTO: 0
PLATELET # BLD AUTO: 67 10^3/UL (ref 150–450)
RBC # BLD AUTO: 2.87 10^6/UL (ref 4–5.4)
WBC # BLD AUTO: 2.8 10^3/UL (ref 3.5–10.8)

## 2018-01-07 PROCEDURE — 71045 X-RAY EXAM CHEST 1 VIEW: CPT

## 2018-01-07 PROCEDURE — 85025 COMPLETE CBC W/AUTO DIFF WBC: CPT

## 2018-01-07 PROCEDURE — 84484 ASSAY OF TROPONIN QUANT: CPT

## 2018-01-07 PROCEDURE — 72125 CT NECK SPINE W/O DYE: CPT

## 2018-01-07 PROCEDURE — G0378 HOSPITAL OBSERVATION PER HR: HCPCS

## 2018-01-07 PROCEDURE — 83605 ASSAY OF LACTIC ACID: CPT

## 2018-01-07 PROCEDURE — 83735 ASSAY OF MAGNESIUM: CPT

## 2018-01-07 PROCEDURE — 80048 BASIC METABOLIC PNL TOTAL CA: CPT

## 2018-01-07 PROCEDURE — 95819 EEG AWAKE AND ASLEEP: CPT

## 2018-01-07 PROCEDURE — 36415 COLL VENOUS BLD VENIPUNCTURE: CPT

## 2018-01-07 PROCEDURE — 81003 URINALYSIS AUTO W/O SCOPE: CPT

## 2018-01-07 PROCEDURE — 85610 PROTHROMBIN TIME: CPT

## 2018-01-07 PROCEDURE — 99285 EMERGENCY DEPT VISIT HI MDM: CPT

## 2018-01-07 PROCEDURE — 70450 CT HEAD/BRAIN W/O DYE: CPT

## 2018-01-07 PROCEDURE — 70551 MRI BRAIN STEM W/O DYE: CPT

## 2018-01-07 PROCEDURE — 80053 COMPREHEN METABOLIC PANEL: CPT

## 2018-01-07 PROCEDURE — 96374 THER/PROPH/DIAG INJ IV PUSH: CPT

## 2018-01-07 PROCEDURE — 85060 BLOOD SMEAR INTERPRETATION: CPT

## 2018-01-07 PROCEDURE — 93005 ELECTROCARDIOGRAM TRACING: CPT

## 2018-01-07 PROCEDURE — 81015 MICROSCOPIC EXAM OF URINE: CPT

## 2018-01-07 PROCEDURE — 99232 SBSQ HOSP IP/OBS MODERATE 35: CPT

## 2018-01-07 PROCEDURE — 80175 DRUG SCREEN QUAN LAMOTRIGINE: CPT

## 2018-01-07 NOTE — RAD
INDICATION:  Seizure.



COMPARISON:  Comparison is made with a prior CT of the brain from October 08, 2016 and a

prior MRI of the brain from October 10, 2016.



TECHNIQUE: Contiguous axial sections of the brain were obtained from the skull base to the

vertex without contrast.



FINDINGS: The ventricles, cisterns and sulci are enlarged consistent with diffuse atrophy.

 There are multiple focal areas of decreased density in the subcortical and

periventricular white matter suggestive of moderate to severe chronic small vessel

ischemic changes. There is a focal area of encephalomalacia in the posterior left parietal

lobe in the region of a prior infarct. No other focal abnormalities or mass effect are

seen. There is no evidence for hemorrhage.



No significant focal osseous abnormality is seen. The visualized portion of the paranasal

sinuses and mastoid air cells appear clear.



IMPRESSION:  

1. NO EVIDENCE FOR ACUTE FINDING.

2. MODERATE SIZE AREA OF ENCEPHALOMALACIA IN THE POSTERIOR LEFT PARIETAL LOBE CONSISTENT

WITH AN OLD INFARCT.

3. ATROPHY AND MODERATE TO SEVERE CHRONIC SMALL VESSEL ISCHEMIC CHANGES.

## 2018-01-07 NOTE — ED
Jasmine SMYTH Julia, scribed for Kunal Quach MD on 01/07/18 at 1836 .





Syncope/Near Syncope





- HPI Summary


HPI Summary: 





This patient is a  85 year old M BIBA  to [facility] accompanied by his son  due to 

a seizure around 12:00 today. Son reports he witnessed patients seizure and 

administered CPR to get him breathing again. Patient had a second seizure on 

route. Patient has a history of seizures. Patient is currently unresponsive.





- History Of Current Complaint


Chief Complaint: EDSeizure


Time Seen by Provider: 01/07/18 12:39


Hx Obtained From: Family/Caretaker, EMS


Hx From Patient Unobtainable Due To: Other - unresponsive


Onset/Duration: Still Present


Timing: Frequency Of Episodes - 2


Context: Unwitnessed





- Allergies/Home Medications


Allergies/Adverse Reactions: 


 Allergies











Allergy/AdvReac Type Severity Reaction Status Date / Time


 


Amoxicillin Allergy  Unknown Verified 10/08/16 18:37





   Reaction  





   Details  


 


Celecoxib [From Celebrex] Allergy  Unknown Verified 10/08/16 18:37





   Reaction  





   Details  














PMH/Surg Hx/FS Hx/Imm Hx


Endocrine/Hematology History: Reports: Hx Anemia


Cardiovascular History: Reports: Hx Angina, Hx Hypercholesterolemia, Hx 

Hypertension


   Denies: Hx Pacemaker/ICD


GI History: Reports: Hx Hiatal Hernia


Musculoskeletal History: Reports: Hx Arthritis - right knee, Hx Back Problems - 

lower back pain, Other Musculoskeletal History - osteoarthritis


Sensory History: Reports: Hx Cataracts - bilat cataract extraction, Hx Contacts 

or Glasses - not with pt, Hx Hearing Problem - [place], does not wear hearing aides


   Denies: Hx Hearing Aid


Opthamlomology History: Reports: Hx Cataracts - bilat cataract extraction, Hx 

Contacts or Glasses - not with pt


Neurological History: Reports: Hx Seizures, Other Neuro Impairments/Disorders - 

expressive aphasia


Psychiatric History: Reports: Hx Depression, Hx Bipolar Disorder, Hx Substance 

Abuse - marijuana and acid, Other Psychiatric Issues/Disorders - bipolar 

depression


   Denies: Hx Panic Disorder





- Cancer History


Cancer Type, Location and Year: Prostate Cancer





- Surgical History


Surgery Procedure, Year, and Place: Prostatectomy, appendix removed as a child,

  LEFT FOOT WITH METAL PIN


Hx Anesthesia Reactions: No


Infectious Disease History: Unable to Obtain/Confirm


Infectious Disease History: 


   Denies: Hx of Known/Suspected MRSA, Traveled Outside the [place] in Last 30 Days





- Family History


Known Family History: Positive: Cardiac Disease, Diabetes





- Social History


Alcohol Use: unk


Alcohol Amount: unk


Substance Use Type: Reports: Other


Substance Use Comment - Amount & Last Used: unk


Hx Tobacco Use: Yes


Smoking Status (MU): Smoker, Current Status Unknown


Type: Pipe


Length of Time of Smoking/Using Tobacco: PIPE 1-2 TIMES A DAY, DOES NOT INHALE


Have You Smoked in the Last Year: Yes - PIPE ONLY, ONCE OR TWICE A DAY





Review of Systems


Negative: Fever


Neurological: Other - seizure


All Other Systems Reviewed And Are Negative: Yes





Physical Exam





- Summary


Physical Exam Summary: 





Appearance: The patient is well-nourished in no acute distress and in no acute 

pain. 


 


Skin: The skin is warm and dry and skin color reflects adequate perfusion.


 





HEENT:  The head is normocephalic and atraumatic. Eyes are open with pinpoint 

pupils. The conjunctivae are clear and without drainage.  Nares are patent and 

without drainage.  Mouth reveals dry mucous membranes and the throat is without 

erythema and exudate.  The external ears are intact. The ear canals are patent 

and without drainage. The tympanic membranes are intact.


 


Neck: the neck is supple with full range of motion and non-tender. There are no 

carotid bruits.  There is no neck vein distension.


 


Respiratory: Chest is non-tender.  Lungs are clear to auscultation and breath 

sounds are symmetrical and equal.


 


Cardiovascular: Heart is regular rate and rhythm.  There is no murmur or rub 

auscultated.   There is no peripheral edema and pulses are symmetrical and 

equal.


 


Abdomen: The abdomen is soft and non-tender.  There are normal bowel sounds 

heard in all four quadrants and there is no organomegaly palpated.


 


Musculoskeletal: There is no back tenderness noted.  Extremities are non-tender 

with full range of motion.  There is good capillary refill.  There is no 

peripheral edema or calf tenderness elicited.


 


Neurological: Patient is unresponsive. Blink reflex present. Unable to elicit 

gag reflex.  Cranial nerves are grossly intact. 


 


Psychiatric: The patient has an appropriate affect and does not exhibit any 

anxiety or depression.





Triage Information Reviewed: Yes


Vital Signs On Initial Exam: 


 Initial Vitals











Temp Pulse Resp BP Pulse Ox


 


 97.9 F   81   18   131/65   100 


 


 01/07/18 12:35  01/07/18 12:35  01/07/18 12:35  01/07/18 12:35  01/07/18 12:35











Vital Signs Reviewed: Yes





Diagnostics





- Vital Signs


 Vital Signs











  Temp Pulse Resp BP Pulse Ox


 


 01/07/18 13:00     128/66 


 


 01/07/18 12:42   82  18   100


 


 01/07/18 12:40     118/63 


 


 01/07/18 12:35  97.9 F  81  18  131/65  100














- Laboratory


Lab Results: 


 Lab Results











  01/07/18 01/07/18 01/07/18 Range/Units





  13:15 13:15 13:15 


 


INR (Anticoag Therapy)   0.96   (0.77-1.02)  


 


Sodium  127 L    (133-145)  mmol/L


 


Potassium  3.5    (3.5-5.0)  mmol/L


 


Chloride  95 L    (101-111)  mmol/L


 


Carbon Dioxide  23    (22-32)  mmol/L


 


Anion Gap  9    (2-11)  mmol/L


 


BUN  15    (6-24)  mg/dL


 


Creatinine  1.07    (0.67-1.17)  mg/dL


 


Est GFR ( Amer)  84.5    (>60)  


 


Est GFR (Non-Af Amer)  65.7    (>60)  


 


BUN/Creatinine Ratio  14.0    (8-20)  


 


Glucose  115 H    ()  mg/dL


 


Lactic Acid    5.7 H*  (0.5-2.0)  mmol/L


 


Calcium  8.5 L    (8.6-10.3)  mg/dL


 


Magnesium  2.0    (1.9-2.7)  mg/dL


 


Total Bilirubin  0.60    (0.2-1.0)  mg/dL


 


AST  31    (13-39)  U/L


 


ALT  26    (7-52)  U/L


 


Alkaline Phosphatase  104    ()  U/L


 


Troponin I  0.03    (<0.04)  ng/mL


 


Total Protein  6.1 L    (6.4-8.9)  g/dL


 


Albumin  3.6    (3.2-5.2)  g/dL


 


Globulin  2.5    (2-4)  g/dL


 


Albumin/Globulin Ratio  1.4    (1-3)  











Result Diagrams: 


 01/07/18 13:15





 01/07/18 13:15


Lab Statement: Any lab studies that have been ordered have been reviewed, and 

results considered in the medical decision making process.





- Radiology


  ** CXR


Radiology Interpretation Completed By: Radiologist - No acute pathology. ED 

physician has reviewed this report.





- CT


  ** Brain


CT Interpretation Completed By: Radiologist - Parietal orbital infarct. ED 

physician has reviewed this report.





Course/Dx


Course Of Treatment: Mr. Tubbs presented post ictal after two generalized 

tonic clonic seizures today.  He has no history of seizure.  He has an 

expressive aphasia from a previous CVA.  His W/U here revealed a new 

pancytopenia.  He is always a litlle anemic and this is unchanged.  He has been 

trreated for thrombocytosis with hydroxyurea and Dr. Johnson feels that this is 

likely the cause.  The reason for his seizure is still obscured.  There is no 

evidence for infection.  His son says he is still not acting like his normal 

self and the hospitalist service has been asked to consult on him.





- Diagnoses


Provider Diagnoses: 


 Seizures, Pancytopenia








- Physician Notifications


Discussed Care of Patient With: Bk Johnson -  recommened to stop hydroxyurea 

and plavix


Time Discussed With Above Provider: 17:40


Instructed by Provider To: Other - Around 18:00 Dr. Lambert recommended Keppra





Discharge





- Discharge Plan


Condition: Stable


Disposition: HOME


Prescriptions: 


levETIRAcetam TAB* [Keppra TAB*] 500 mg PO BID #20 tab


Patient Education Materials:  Nonepileptic Seizures (ED)


Referrals: 


David Nguyễn MD [Primary Care Provider] - 


Additional Instructions: 


Patient will be discharged with a prescription for Levetiracetam.


Patient is instructed to follow up with Dr. Johnson  this week and Dr. Enrique 

this week.


Patient is instructed to stop hydroxyurea and Plavix.


Patient is agreeable with this plan.





RETURN TO THE EMERGENCY DEPARTMENT FOR CHANGING OR WORSENING SYMPTOMS.








The documentation as recorded by the Jasmine desouza Julia accurately reflects 

the service I personally performed and the decisions made by me, Kunal Quach MD.

## 2018-01-07 NOTE — RAD
INDICATION:  Seizure.



COMPARISON:  Comparison is made with a prior chest x-ray study from September 04, 2017.



TECHNIQUE: A portable view of the chest was obtained.



FINDINGS: Cardiac and mediastinal contours appear to be within normal limits.



The lungs are clear. No pleural effusion is seen.



IMPRESSION:  NO EVIDENCE FOR ACUTE DISEASE.

## 2018-01-08 LAB
BASOPHILS # BLD AUTO: 0 10^3/UL (ref 0–0.2)
EOSINOPHIL # BLD AUTO: 0 10^3/UL (ref 0–0.6)
HCT VFR BLD AUTO: 31 % (ref 42–52)
HGB BLD-MCNC: 10.9 G/DL (ref 14–18)
LYMPHOCYTES # BLD AUTO: 0.6 10^3/UL (ref 1–4.8)
MCH RBC QN AUTO: 36 PG (ref 27–31)
MCHC RBC AUTO-ENTMCNC: 35 G/DL (ref 31–36)
MCV RBC AUTO: 104 FL (ref 80–94)
MONOCYTES # BLD AUTO: 0.3 10^3/UL (ref 0–0.8)
NEUTROPHILS # BLD AUTO: 2.7 10^3/UL (ref 1.5–7.7)
NRBC # BLD AUTO: 0 10^3/UL
NRBC BLD QL AUTO: 0
PLATELET # BLD AUTO: 68 10^3/UL (ref 150–450)
RBC # BLD AUTO: 3.01 10^6/UL (ref 4–5.4)
WBC # BLD AUTO: 3.6 10^3/UL (ref 3.5–10.8)

## 2018-01-08 RX ADMIN — METOPROLOL TARTRATE SCH MG: 25 TABLET, FILM COATED ORAL at 10:04

## 2018-01-08 RX ADMIN — APIXABAN SCH MG: 2.5 TABLET, FILM COATED ORAL at 10:03

## 2018-01-08 RX ADMIN — CEFUROXIME AXETIL SCH MG: 250 TABLET ORAL at 10:03

## 2018-01-08 RX ADMIN — DOCUSATE SODIUM SCH MG: 100 CAPSULE, LIQUID FILLED ORAL at 10:03

## 2018-01-08 RX ADMIN — OMEPRAZOLE SCH MG: 20 CAPSULE, DELAYED RELEASE ORAL at 04:56

## 2018-01-08 RX ADMIN — OMEPRAZOLE SCH MG: 20 CAPSULE, DELAYED RELEASE ORAL at 10:04

## 2018-01-08 RX ADMIN — LEVETIRACETAM SCH MG: 500 TABLET, FILM COATED ORAL at 10:04

## 2018-01-08 NOTE — HP
H&P (Free Text)


History and Physical: 





PCP: ANNALEE Nguyễn MD





Date/Time: 01/08/2018 0250





CC: seizure





HPI: Mr Ian Tubbs (Jake) is an 84YO male HX L MCA CVA w/ residual 

expressive aphasia most notable for his use of the word 'bowl' as substitute 

for others. Last night he had gone to the bathroom when he became a bit 

unsteady and his son who isn't present helped him to bed. He then developed 

generalized convulsions for which EMS was called. En route he had another grand 

mal seizure despite being on lamotrigine for bipolar disorder. Upon arrival to 

ED he was post-ictal. There was no reported loss of bowel/bladder. Upon my 

evaluation he was nearly back to baseline per his other son who had not 

witnessed the seizure. LETTY Enrique MD neurology was consulted by ED and 

recommended adding 500mg PO levetiracetam. He will be observed overnight for 

further seizure activity in order to r/o status. GERALD Johnson MD consulted by ED for 

hydroxyurea induced leuko-thombocytopenia for which he recommended D/C of 

hydroxyurea & clopidogrel.





PMedHx


L MCA  CVA 2016


HTN


HLD


thrombocytosis


prostate CA ([place] 3+3) s/p radical prostatectomy


GERD


bipolar disorder


depression





Ambulatory Orders


Omeprazole CAP* [Prilosec CAP* 20 MG] 20 mg PO DAILY 05/18/16 


Atorvastatin* [Lipitor 40 MG*] 40 mg PO 1700 #30 tab 05/20/16 


Clopidogrel TAB* [Plavix TAB*] 75 mg PO DAILY  tab 10/13/16 


Metoprolol Tartrate TAB* [Lopressor TAB*] 12.5 mg PO Q12HR  tab 10/13/16 


Cyanocobalamin TAB* [Vitamin B12 TAB*] 1,000 mcg PO DAILY 08/19/17 


Ferrous Gluconate TAB* [Fergon TAB*] 324 mg PO DAILY 08/19/17 


Omega-3 Fatty Acids [Fish Oil] 1,000 mg PO DAILY 08/19/17 


Acetaminophen TAB* [Tylenol TAB*] 975 mg PO Q6HR PRN #0 tab 08/23/17 


Magnesium Hydroxide LIQ* [Milk of Magnesia LIQ*] 30 ml PO Q4H PRN #0 udc 08/23/ 17 


lamoTRIgine TAB(*) [Lamictal TAB(*)] 25 mg PO BEDTIME  tab 08/23/17 


lamoTRIgine TAB(*) [Lamictal TAB(*)] 100 mg PO BEDTIME  tab 08/23/17 


oxyCODONE/Acetamin 5/325 MG* [Percocet 5/325 TAB*] 1 - 2 tab PO Q4H PRN #30 tab 

MDD 8 08/23/17 


Apixaban* [Eliquis] 2.5 mg PO BID #28 tab 09/07/17 


ceFUROXime TAB(*) [Ceftin  MG(*)] 250 mg PO BID #14 tab 09/07/17 


levETIRAcetam TAB* [Keppra TAB*] 500 mg PO BID #20 tab 01/07/18 





Allergies


Amoxicillin Allergy (Verified 10/08/16 18:37)


 Unknown Reaction Details


Celecoxib [From Celebrex] Allergy (Verified 10/08/16 18:37)


 Unknown Reaction Details





PSurgHx


OU cataract extractions


tonsillectomy


appendectomy


prostatectomy


R hip luis daniel-arthoplasty





SocHx: smokes a pipe 1-2x day denies inhalation, rare alcohol, no recreational 

drugs; DNR code status





FamHx: positive for HTN  CVA





ROS: as above, otherwise reviewed and all were negative





vitals: 


 Vital Signs











Temp  36.8 C   01/08/18 03:43


 


Pulse  60   01/08/18 03:43


 


Resp  18   01/08/18 03:43


 


BP  133/62   01/08/18 03:43


 


Pulse Ox  99   01/08/18 03:43








 Intake & Output











 01/07/18 01/07/18 01/08/18





 11:59 23:59 11:59


 


Intake Total   0


 


Balance   0


 


Weight  74.843 kg 47.899 kg


 


Intake:   


 


  Oral   0


 


Other:   


 


  # Bowel Movements   0


 


  # Voids   0








Constitutional: NAD, normally developed, underweight elderly white male


HEENM: atraumatic; sclera/conjunctiva: anicteric ; hearing: clinically mildly 

decreased; oropharynx: clear, mucosa tacky


Neck: soft tissue: non-tender, no nuchal rigidity; thyroid: normal


Pulmonary: clear to auscultation bilaterally, good aeration, no accessory 

muscle use 


CV: RR/RR, normal S1S2, no carotid bruit, no jugular venous distention, 1+ B DP/

PT, no edema


Abdominal: soft, non-distended, non-tender, no rebound/guarding/rigidity, 

normoactive bowel sounds, no hepatosplenomegaly or masses, no costovertebral 

angle tenderness


Musculoskeletal: general: grossly intact, no tenderness with palpation


Integumental: normal appearance and texture of exposed skin





Psychiatric 


orientation: AA&O to PPS


affect: calm


mood: cooperative


eye contact: poor


content: difficult to understand, occasionally incoherent, frequently uses the 

word 'bowl' inappropriately


responses: timely


insight: fair to poor





Testing: 


 Lab Results











  01/07/18 01/07/18 01/07/18 Range/Units





  13:15 13:15 13:15 


 


WBC  2.8 L    (3.5-10.8)  10^3/ul


 


RBC  2.87 L    (4.0-5.4)  10^6/ul


 


Hgb  10.4 L    (14.0-18.0)  g/dl


 


Hct  30 L    (42-52)  %


 


MCV  104 H    (80-94)  fL


 


MCH  36 H    (27-31)  pg


 


MCHC  35    (31-36)  g/dl


 


RDW  35 H    (10.5-15)  %


 


Plt Count  67 L    (150-450)  10^3/ul


 


MPV  7 L    (7.4-10.4)  um3


 


Neut % (Auto)  85.6 H    (38-83)  %


 


Lymph % (Auto)  9.1 L    (25-47)  %


 


Mono % (Auto)  4.4    (1-9)  %


 


Eos % (Auto)  0.6    (0-6)  %


 


Baso % (Auto)  0.3    (0-2)  %


 


Absolute Neuts (auto)  2.4    (1.5-7.7)  10^3/ul


 


Absolute Lymphs (auto)  0.3 L    (1.0-4.8)  10^3/ul


 


Absolute Monos (auto)  0.1    (0-0.8)  10^3/ul


 


Absolute Eos (auto)  0    (0-0.6)  10^3/ul


 


Absolute Basos (auto)  0    (0-0.2)  10^3/ul


 


Absolute Nucleated RBC  0    10^3/ul


 


Nucleated RBC %  0    


 


Anisocytosis  3+    


 


Macrocytosis  1+    


 


Acanthocytes (Spur)  2+    


 


INR (Anticoag Therapy)    0.96  (0.77-1.02)  


 


Sodium   127 L   (133-145)  mmol/L


 


Potassium   3.5   (3.5-5.0)  mmol/L


 


Chloride   95 L   (101-111)  mmol/L


 


Carbon Dioxide   23   (22-32)  mmol/L


 


Anion Gap   9   (2-11)  mmol/L


 


BUN   15   (6-24)  mg/dL


 


Creatinine   1.07   (0.67-1.17)  mg/dL


 


Est GFR ( Amer)   84.5   (>60)  


 


Est GFR (Non-Af Amer)   65.7   (>60)  


 


BUN/Creatinine Ratio   14.0   (8-20)  


 


Glucose   115 H   ()  mg/dL


 


Lactic Acid     (0.5-2.0)  mmol/L


 


Calcium   8.5 L   (8.6-10.3)  mg/dL


 


Magnesium   2.0   (1.9-2.7)  mg/dL


 


Total Bilirubin   0.60   (0.2-1.0)  mg/dL


 


AST   31   (13-39)  U/L


 


ALT   26   (7-52)  U/L


 


Alkaline Phosphatase   104   ()  U/L


 


Troponin I   0.03   (<0.04)  ng/mL


 


Total Protein   6.1 L   (6.4-8.9)  g/dL


 


Albumin   3.6   (3.2-5.2)  g/dL


 


Globulin   2.5   (2-4)  g/dL


 


Albumin/Globulin Ratio   1.4   (1-3)  


 


Urine Color     


 


Urine Appearance     


 


Urine pH     (5-9)  


 


Ur Specific Gravity     (1.010-1.030)  


 


Urine Protein     (Negative)  


 


Urine Ketones     (Negative)  


 


Urine Blood     (Negative)  


 


Urine Nitrate     (Negative)  


 


Urine Bilirubin     (Negative)  


 


Urine Urobilinogen     (Negative)  


 


Ur Leukocyte Esterase     (Negative)  


 


Urine WBC (Auto)     (Absent)  


 


Urine RBC (Auto)     (Absent)  


 


Urine Bacteria     (Absent)  


 


Urine Glucose     (Negative)  














  01/07/18 01/07/18 01/08/18 Range/Units





  13:15 17:30 06:08 


 


WBC     (3.5-10.8)  10^3/ul


 


RBC     (4.0-5.4)  10^6/ul


 


Hgb     (14.0-18.0)  g/dl


 


Hct     (42-52)  %


 


MCV     (80-94)  fL


 


MCH     (27-31)  pg


 


MCHC     (31-36)  g/dl


 


RDW     (10.5-15)  %


 


Plt Count     (150-450)  10^3/ul


 


MPV     (7.4-10.4)  um3


 


Neut % (Auto)     (38-83)  %


 


Lymph % (Auto)     (25-47)  %


 


Mono % (Auto)     (1-9)  %


 


Eos % (Auto)     (0-6)  %


 


Baso % (Auto)     (0-2)  %


 


Absolute Neuts (auto)     (1.5-7.7)  10^3/ul


 


Absolute Lymphs (auto)     (1.0-4.8)  10^3/ul


 


Absolute Monos (auto)     (0-0.8)  10^3/ul


 


Absolute Eos (auto)     (0-0.6)  10^3/ul


 


Absolute Basos (auto)     (0-0.2)  10^3/ul


 


Absolute Nucleated RBC     10^3/ul


 


Nucleated RBC %     


 


Anisocytosis     


 


Macrocytosis     


 


Acanthocytes (Spur)     


 


INR (Anticoag Therapy)     (0.77-1.02)  


 


Sodium    128 L  (133-145)  mmol/L


 


Potassium    4.1  (3.5-5.0)  mmol/L


 


Chloride    96 L  (101-111)  mmol/L


 


Carbon Dioxide    28  (22-32)  mmol/L


 


Anion Gap    4  (2-11)  mmol/L


 


BUN    12  (6-24)  mg/dL


 


Creatinine    0.89  (0.67-1.17)  mg/dL


 


Est GFR ( Amer)    104.5  (>60)  


 


Est GFR (Non-Af Amer)    81.2  (>60)  


 


BUN/Creatinine Ratio    13.5  (8-20)  


 


Glucose    93  ()  mg/dL


 


Lactic Acid  5.7 H*    (0.5-2.0)  mmol/L


 


Calcium    9.2  (8.6-10.3)  mg/dL


 


Magnesium     (1.9-2.7)  mg/dL


 


Total Bilirubin     (0.2-1.0)  mg/dL


 


AST     (13-39)  U/L


 


ALT     (7-52)  U/L


 


Alkaline Phosphatase     ()  U/L


 


Troponin I     (<0.04)  ng/mL


 


Total Protein     (6.4-8.9)  g/dL


 


Albumin     (3.2-5.2)  g/dL


 


Globulin     (2-4)  g/dL


 


Albumin/Globulin Ratio     (1-3)  


 


Urine Color   Yellow   


 


Urine Appearance   Clear   


 


Urine pH   6.0   (5-9)  


 


Ur Specific Gravity   1.012   (1.010-1.030)  


 


Urine Protein   Negative   (Negative)  


 


Urine Ketones   Negative   (Negative)  


 


Urine Blood   1+ H   (Negative)  


 


Urine Nitrate   Negative   (Negative)  


 


Urine Bilirubin   Negative   (Negative)  


 


Urine Urobilinogen   Negative   (Negative)  


 


Ur Leukocyte Esterase   Negative   (Negative)  


 


Urine WBC (Auto)   1+(6-10/hpf) H   (Absent)  


 


Urine RBC (Auto)   1+(3-5/hpf) H   (Absent)  


 


Urine Bacteria   Absent   (Absent)  


 


Urine Glucose   Negative   (Negative)  














  01/08/18 01/08/18 Range/Units





  06:08 06:08 


 


WBC  3.6   (3.5-10.8)  10^3/ul


 


RBC  3.01 L   (4.0-5.4)  10^6/ul


 


Hgb  10.9 L   (14.0-18.0)  g/dl


 


Hct  31 L   (42-52)  %


 


MCV  104 H   (80-94)  fL


 


MCH  36 H   (27-31)  pg


 


MCHC  35   (31-36)  g/dl


 


RDW  35 H   (10.5-15)  %


 


Plt Count  68 L   (150-450)  10^3/ul


 


MPV  7 L   (7.4-10.4)  um3


 


Neut % (Auto)  74.2   (38-83)  %


 


Lymph % (Auto)  17.0 L   (25-47)  %


 


Mono % (Auto)  7.1   (1-9)  %


 


Eos % (Auto)  0.9   (0-6)  %


 


Baso % (Auto)  0.8   (0-2)  %


 


Absolute Neuts (auto)  2.7   (1.5-7.7)  10^3/ul


 


Absolute Lymphs (auto)  0.6 L   (1.0-4.8)  10^3/ul


 


Absolute Monos (auto)  0.3   (0-0.8)  10^3/ul


 


Absolute Eos (auto)  0   (0-0.6)  10^3/ul


 


Absolute Basos (auto)  0   (0-0.2)  10^3/ul


 


Absolute Nucleated RBC  0   10^3/ul


 


Nucleated RBC %  0   


 


Anisocytosis    


 


Macrocytosis    


 


Acanthocytes (Spur)    


 


INR (Anticoag Therapy)    (0.77-1.02)  


 


Sodium    (133-145)  mmol/L


 


Potassium    (3.5-5.0)  mmol/L


 


Chloride    (101-111)  mmol/L


 


Carbon Dioxide    (22-32)  mmol/L


 


Anion Gap    (2-11)  mmol/L


 


BUN    (6-24)  mg/dL


 


Creatinine    (0.67-1.17)  mg/dL


 


Est GFR ( Amer)    (>60)  


 


Est GFR (Non-Af Amer)    (>60)  


 


BUN/Creatinine Ratio    (8-20)  


 


Glucose    ()  mg/dL


 


Lactic Acid   0.8  (0.5-2.0)  mmol/L


 


Calcium    (8.6-10.3)  mg/dL


 


Magnesium    (1.9-2.7)  mg/dL


 


Total Bilirubin    (0.2-1.0)  mg/dL


 


AST    (13-39)  U/L


 


ALT    (7-52)  U/L


 


Alkaline Phosphatase    ()  U/L


 


Troponin I    (<0.04)  ng/mL


 


Total Protein    (6.4-8.9)  g/dL


 


Albumin    (3.2-5.2)  g/dL


 


Globulin    (2-4)  g/dL


 


Albumin/Globulin Ratio    (1-3)  


 


Urine Color    


 


Urine Appearance    


 


Urine pH    (5-9)  


 


Ur Specific Gravity    (1.010-1.030)  


 


Urine Protein    (Negative)  


 


Urine Ketones    (Negative)  


 


Urine Blood    (Negative)  


 


Urine Nitrate    (Negative)  


 


Urine Bilirubin    (Negative)  


 


Urine Urobilinogen    (Negative)  


 


Ur Leukocyte Esterase    (Negative)  


 


Urine WBC (Auto)    (Absent)  


 


Urine RBC (Auto)    (Absent)  


 


Urine Bacteria    (Absent)  


 


Urine Glucose    (Negative)  








ECG, personally reviewed: 1st degree AV RBBB rate 86, no ischemia





CXR, personally reviewed: IMPRESSION:  NO EVIDENCE FOR ACUTE DISEASE.





CT brain WO, personally reviewed: IMPRESSION:  


   1. NO EVIDENCE FOR ACUTE FINDING.


   2. MODERATE SIZE AREA OF ENCEPHALOMALACIA IN THE POSTERIOR LEFT 


      PARIETAL LOBE CONSISTENT WITH AN OLD INFARCT.


   3. ATROPHY AND MODERATE TO SEVERE CHRONIC SMALL VESSEL ISCHEMIC 


      CHANGES.





Impression: 85M HX CVA presenting with new onset seizures, r/o status





DIAGNOSIS & PLAN


Primary 


new onset seizures, HX L MCA CVA


: continue lamotrigine


: add levetiracetam 500mg PO BID


: telemetry


: seizure precautions


: EEG in AM


: MRI in AM


: LETTY Enrique MD neurology consulted, will evaluate in AM


: supplemental oxygen


: supportive care





leuko-thrombocytopenia


: HX thrombocytosis


: D/C hyroxyurea & clopidogrel


: GERALD Johnson MD hematology/oncology consulted





Secondary 


HTN


: review meds once reconciled





HLD


: review meds once reconciled





prostate CA ([place] 3+3) s/p radical prostatectomy


: no acute issues


: continue outpatient surveillance





GERD


: review meds once reconciled





bipolar disorder


: continue lamotrigine





Admission Rational: observation for new onset seizure to r/o status


DVTp: SCDs, no heparin to avoid potentiating hydroxyurea induced 

thrombocytopenia


Code Status: DNR


HCP: sonEligio

## 2018-01-08 NOTE — RAD
HISTORY: Prostate cancer, seizure



COMPARISONS: Head CT dated January 07, 2018



TECHNIQUE: The following sequences were obtained of the head: Sagittal T1-weighted images,

axial T2-weighted images, axial FLAIR images, axial susceptibility weighted images, axial

T1-weighted images, coronal T1, T2 and FLAIR images through the mesial temporal lobes.

Additionally, axial diffusion-weighted images were obtained with calculated apparent

diffusion coefficients.    



FINDINGS: 

HEMORRHAGE/INFARCT: There is no hemorrhage or acute infarct.

MASSES/SHIFT: There is no mass or shift.

EXTRA-AXIAL SPACES/MENINGES: There are no extra-axial fluid collections.

SULCI AND VENTRICLES: There is diffuse and proportional enlargement of the sulci and

ventricles. There is ex vacuo dilatation of the occipital and temporal horns of the left

lateral ventricle.



CEREBRUM: There is diffuse confluent elevated T2/FLAIR signal in the periventricular and

subcortical white matter. There is encephalomalacia involving the left posterior temporal

and inferior parietal lobe. There is associated volume loss of the left mesial temporal

lobe.

BRAINSTEM: There is Wallerian degeneration degeneration involving the left cerebral

peduncle and thao.

CEREBELLUM: There are no focal parenchymal abnormalities. The cerebellar tonsils are

normal in size and position.



SELLA: The sella is normal.

PINEAL: The pineal region is clear.

CP ANGLE/TEMPORAL BONES: The labyrinthine structures are grossly normal.



VESSELS: Normal flow-voids are noted within the visualized vertebral vasculature.

DIFFUSION ABNORMALITIES: There are no diffusion abnormalities. 



PARANASAL SINUSES/MASTOIDS: The paranasal sinuses are clear.

ORBITS: The orbits are unremarkable.

BONES AND SOFT TISSUE: No bone or soft tissue abnormalities are noted.



OTHER: None



IMPRESSION: 

1.  LEFT TEMPORAL AND INFERIOR PARIETAL ENCEPHALOMALACIA CONSISTENT WITH REMOTE INFARCT,

WITH ASSOCIATED WALLERIAN DEGENERATION AND VOLUME LOSS OF THE LEFT MESIAL TEMPORAL LOBE.

2.  DIFFUSELY ELEVATED T2/FLAIR SIGNAL IN THE PERIVENTRICULAR AND SUBCORTICAL WHITE MATTER

SUGGESTING CHRONIC SMALL VESSEL ISCHEMIA.

## 2018-01-08 NOTE — PN
Progress Note





- Progress Note


Date of Service: 01/08/18


SOAP: 


Subjective:


[]Feels better today, does not remember seizure. Had been doing well per his 

report prior to event. Son not at bedside this am, will call. 











Acetaminophen (Tylenol Tab*)  650 mg PO Q6H PRN


   PRN Reason: FEVER/PAIN


Apixaban (Eliquis)  2.5 mg PO BID [facility]


   Stop: 01/21/18 21:01


   Last Admin: 01/08/18 10:03 Dose:  2.5 mg


Atorvastatin Calcium (Lipitor*)  40 mg PO 1700 [facility]


Cefuroxime Axetil (Ceftin Tab(*))  250 mg PO BID [facility]


   Stop: 01/14/18 21:01


   Last Admin: 01/08/18 10:03 Dose:  250 mg


Docusate Sodium (Colace Cap*)  200 mg PO BID [facility]


   Last Admin: 01/08/18 10:03 Dose:  200 mg


Sodium Chloride (Ns 0.9% 1000 Ml*)  1,000 mls @ 50 mls/hr IV PER RATE [facility]


   Last Admin: 01/08/18 04:56 Dose:  50 mls/hr


Lamotrigine (Lamictal Tab(*))  25 mg PO BEDTIME [facility]


Lamotrigine (Lamictal Tab(*))  100 mg PO BEDTIME [facility]


Levetiracetam (Keppra Tab*)  500 mg PO BID [facility]


   Last Admin: 01/08/18 10:04 Dose:  500 mg


Magnesium Hydroxide (Milk Of Magnesia Liq*)  30 ml PO Q4H PRN


   PRN Reason: CONSTIPATION


Melatonin (Melatonin (Nf))  3 mg PO BEDTIME PRN; Protocol


   PRN Reason: Sleep


Metoprolol Tartrate (Lopressor Tab*)  12.5 mg PO Q12HR [facility]


   Last Admin: 01/08/18 10:04 Dose:  12.5 mg


Omeprazole (Prilosec Cap*)  20 mg PO DAILY@0600 [facility]


   Last Admin: 01/08/18 04:56 Dose:  20 mg


Omeprazole (Prilosec Cap*)  20 mg PO DAILY@0730 [facility]


   Last Admin: 01/08/18 10:04 Dose:  20 mg


Ondansetron HCl (Zofran Inj*)  4 mg IV Q6H PRN


   PRN Reason: NAUSEA


Oxycodone/Acetaminophen (Percocet 5/325 Tab*)  1 tab PO Q4H PRN


   PRN Reason: Moderate to severe pain








Objective:


[]


 Vital Signs











Temp Pulse Resp BP Pulse Ox


 


 97.9 F   65   14   153/63   100 


 


 01/08/18 08:44  01/08/18 08:44  01/08/18 08:44  01/08/18 08:44  01/08/18 08:44








HEENT - PERRL, no oral lesions


CTA


Irregular S1S2


+BS, NT ND, + spleen 4 cm


Ext no c/c/e





 Laboratory Results - last 24 hr











  01/07/18 01/07/18 01/07/18





  13:15 13:15 13:15


 


WBC  2.8 L  


 


RBC  2.87 L  


 


Hgb  10.4 L  


 


Hct  30 L  


 


MCV  104 H  


 


MCH  36 H  


 


MCHC  35  


 


RDW  35 H  


 


Plt Count  67 L  


 


MPV  7 L  


 


Neut % (Auto)  85.6 H  


 


Lymph % (Auto)  9.1 L  


 


Mono % (Auto)  4.4  


 


Eos % (Auto)  0.6  


 


Baso % (Auto)  0.3  


 


Absolute Neuts (auto)  2.4  


 


Absolute Lymphs (auto)  0.3 L  


 


Absolute Monos (auto)  0.1  


 


Absolute Eos (auto)  0  


 


Absolute Basos (auto)  0  


 


Absolute Nucleated RBC  0  


 


Nucleated RBC %  0  


 


Anisocytosis  3+  


 


Macrocytosis  1+  


 


Acanthocytes (Spur)  2+  


 


INR (Anticoag Therapy)    0.96


 


Sodium   127 L 


 


Potassium   3.5 


 


Chloride   95 L 


 


Carbon Dioxide   23 


 


Anion Gap   9 


 


BUN   15 


 


Creatinine   1.07 


 


Est GFR ( Amer)   84.5 


 


Est GFR (Non-Af Amer)   65.7 


 


BUN/Creatinine Ratio   14.0 


 


Glucose   115 H 


 


Lactic Acid   


 


Calcium   8.5 L 


 


Magnesium   2.0 


 


Total Bilirubin   0.60 


 


AST   31 


 


ALT   26 


 


Alkaline Phosphatase   104 


 


Troponin I   0.03 


 


Total Protein   6.1 L 


 


Albumin   3.6 


 


Globulin   2.5 


 


Albumin/Globulin Ratio   1.4 


 


Urine Color   


 


Urine Appearance   


 


Urine pH   


 


Ur Specific Gravity   


 


Urine Protein   


 


Urine Ketones   


 


Urine Blood   


 


Urine Nitrate   


 


Urine Bilirubin   


 


Urine Urobilinogen   


 


Ur Leukocyte Esterase   


 


Urine WBC (Auto)   


 


Urine RBC (Auto)   


 


Urine Bacteria   


 


Urine Glucose   














  01/07/18 01/07/18 01/08/18





  13:15 17:30 06:08


 


WBC   


 


RBC   


 


Hgb   


 


Hct   


 


MCV   


 


MCH   


 


MCHC   


 


RDW   


 


Plt Count   


 


MPV   


 


Neut % (Auto)   


 


Lymph % (Auto)   


 


Mono % (Auto)   


 


Eos % (Auto)   


 


Baso % (Auto)   


 


Absolute Neuts (auto)   


 


Absolute Lymphs (auto)   


 


Absolute Monos (auto)   


 


Absolute Eos (auto)   


 


Absolute Basos (auto)   


 


Absolute Nucleated RBC   


 


Nucleated RBC %   


 


Anisocytosis   


 


Macrocytosis   


 


Acanthocytes (Spur)   


 


INR (Anticoag Therapy)   


 


Sodium    128 L


 


Potassium    4.1


 


Chloride    96 L


 


Carbon Dioxide    28


 


Anion Gap    4


 


BUN    12


 


Creatinine    0.89


 


Est GFR ( Amer)    104.5


 


Est GFR (Non-Af Amer)    81.2


 


BUN/Creatinine Ratio    13.5


 


Glucose    93


 


Lactic Acid  5.7 H*  


 


Calcium    9.2


 


Magnesium   


 


Total Bilirubin   


 


AST   


 


ALT   


 


Alkaline Phosphatase   


 


Troponin I   


 


Total Protein   


 


Albumin   


 


Globulin   


 


Albumin/Globulin Ratio   


 


Urine Color   Yellow 


 


Urine Appearance   Clear 


 


Urine pH   6.0 


 


Ur Specific Gravity   1.012 


 


Urine Protein   Negative 


 


Urine Ketones   Negative 


 


Urine Blood   1+ H 


 


Urine Nitrate   Negative 


 


Urine Bilirubin   Negative 


 


Urine Urobilinogen   Negative 


 


Ur Leukocyte Esterase   Negative 


 


Urine WBC (Auto)   1+(6-10/hpf) H 


 


Urine RBC (Auto)   1+(3-5/hpf) H 


 


Urine Bacteria   Absent 


 


Urine Glucose   Negative 














  01/08/18 01/08/18





  06:08 06:08


 


WBC  3.6 


 


RBC  3.01 L 


 


Hgb  10.9 L 


 


Hct  31 L 


 


MCV  104 H 


 


MCH  36 H 


 


MCHC  35 


 


RDW  35 H 


 


Plt Count  68 L 


 


MPV  7 L 


 


Neut % (Auto)  74.2 


 


Lymph % (Auto)  17.0 L 


 


Mono % (Auto)  7.1 


 


Eos % (Auto)  0.9 


 


Baso % (Auto)  0.8 


 


Absolute Neuts (auto)  2.7 


 


Absolute Lymphs (auto)  0.6 L 


 


Absolute Monos (auto)  0.3 


 


Absolute Eos (auto)  0 


 


Absolute Basos (auto)  0 


 


Absolute Nucleated RBC  0 


 


Nucleated RBC %  0 


 


Anisocytosis  


 


Macrocytosis  


 


Acanthocytes (Spur)  


 


INR (Anticoag Therapy)  


 


Sodium  


 


Potassium  


 


Chloride  


 


Carbon Dioxide  


 


Anion Gap  


 


BUN  


 


Creatinine  


 


Est GFR ( Amer)  


 


Est GFR (Non-Af Amer)  


 


BUN/Creatinine Ratio  


 


Glucose  


 


Lactic Acid   0.8


 


Calcium  


 


Magnesium  


 


Total Bilirubin  


 


AST  


 


ALT  


 


Alkaline Phosphatase  


 


Troponin I  


 


Total Protein  


 


Albumin  


 


Globulin  


 


Albumin/Globulin Ratio  


 


Urine Color  


 


Urine Appearance  


 


Urine pH  


 


Ur Specific Gravity  


 


Urine Protein  


 


Urine Ketones  


 


Urine Blood  


 


Urine Nitrate  


 


Urine Bilirubin  


 


Urine Urobilinogen  


 


Ur Leukocyte Esterase  


 


Urine WBC (Auto)  


 


Urine RBC (Auto)  


 


Urine Bacteria  


 


Urine Glucose  

















Assessment:


[]85 year old with history of essential thrombocytosis and stroke. Presents 

with seizure and pan-cytopenia on Hydroxyurea 500 mg daily. 





Plan:


[]1. Necrology evaluation pending, on keppra


2. Hold HU and will follow up in clinic, re-start at 500 mg TIW if 

thrombocytosis recurs.

## 2018-01-08 NOTE — RAD
HISTORY: Trauma, and witnessed fall



COMPARISONS: None



TECHNIQUE: Multiple contiguous axial CT scans were obtained of the cervical spine without

intravenous contrast, with coronal and sagittal multiplanar reformations.    



FINDINGS:



BRAIN: The visualized brain is unremarkable

CENTRAL CANAL: Evaluation of the central canal is limited on CT technique; however, there

is no obvious canalicular mass or epidural hemorrhage.



ALIGNMENT: The alignment is normal, without subluxation or dislocation.

VERTEBRAL BODIES: There is diffuse osteopenia. Is multilevel anterolateral marginal

osteophyte formation. There is no displaced fracture or dislocation.

JOINTS: There is a vertebral and facet osteoarthritis.

MUSCULATURE: Unremarkable

INTERVERTEBRAL DISCS: There is diffuse loss of intervertebral disc height.



AXIAL IMAGES:

On axial images, there is diffuse mild to moderate narrowing of the neural foramina. There

is no osseous central canal stenosis.



SOFT TISSUES: The visualized soft tissues of the neck are unremarkable. The prevertebral

fat stripe is preserved.



OTHER: None.



IMPRESSION: 

OSTEOPENIA.

DEGENERATIVE DISC DISEASE AND OSTEOARTHRITIS.

NO ACUTE OSSEOUS INJURY TO THE CERVICAL SPINE

## 2018-01-08 NOTE — RAD
HISTORY: Trauma, unwitnessed fall



COMPARISONS: Head CT dated January 07, 2018



TECHNIQUE: Multiple contiguous axial CT scans were obtained of the head without 

intravenous contrast. 



FINDINGS: 

HEMORRHAGE/INFARCT: There is no hemorrhage or acute infarct.

MASSES/SHIFT: There is no mass or shift.



EXTRA-AXIAL SPACES: There are no extra-axial fluid collections.

SULCI AND VENTRICLES: There is diffuse and proportional enlargement of the sulci and

ventricles.



CEREBRUM: There is stable left frontoparietal and septal malacia consistent with remote

infarct.

BRAINSTEM: There are no focal parenchymal abnormalities.

CEREBELLUM: There are no focal parenchymal abnormalities.



VESSELS: The vessels are grossly normal.

PARANASAL SINUSES: The paranasal sinuses are clear.

ORBITS: The orbits are unremarkable.

BONES AND SOFT TISSUE: No bone or soft tissue abnormalities are noted.



OTHER: None



IMPRESSION: 

STABLE LEFT FRONTOPARIETAL ENCEPHALOMALACIA CONSISTENT WITH REMOTE INFARCT. NO ACUTE

INTRACRANIAL PATHOLOGY.

## 2018-01-08 NOTE — CONS
NEUROLOGY CONSULTATION:

 

DATE OF CONSULT:  01/08/18

 

LOCATION:  He is an inpatient in room 451.

 

REFERRING PHYSICIAN:  Dr. Frankenberg.

 

PRIMARY CARE PROVIDER:  Dr. Nguyễn.

 

CHIEF COMPLAINT:  Seizure.

 

HISTORY OF PRESENT ILLNESS:  Ian Tubbs is an 85-year-old man who was at 
his home yesterday when one of his care providers who is present today said 
that he started to act more confused than usual.  He had been brushing his 
teeth and doing his morning hygiene and he started to drool.  His aide asked 
him what was the problem and tried to sit him down, but he noticed that his 
right arm was stiff.  He tried to get him on his walker to get to some place 
safer, but he could not get his right hand on to the walker where he said been 
using his left fine.  He was talking initially, but ultimately his whole body 
stiffened and he was eased to the ground. There were no jerking movements, but 
just tonic spasms and his eyes remained open and he was unresponsive and 
nonverbal.  This went on for several minutes and after that he was flaccid, 
limp.  His eyes remained open, but he did not respond to voice and an ambulance 
was summoned.  He was brought into the emergency room and he had a convulsion 
in the ambulance according to ER records.  In the emergency room, he was 
described as "postictal."  Since then, he is recovered and his family feels he 
is back at his baseline.  He is on lamotrigine 125 mg at bedtime for bipolar 
affective disorder.  His son who is also present said that he may have had a 
seizure in last November when he was septic.  He was brought to the hospital I 
believe from a rehab stay and apparently there is some convulsive activity.  
Reviewing the hospital records, this was actually September 5th when he was 
admitted with "seizure-like activity."  He was in rehab recuperating from a 
right hip hemiarthroplasty.  There is no good description of the event in the 
hospital record as it was not observed by [facility] staff, but rather according to 
nurse at his rehab centers having "small seizure-like activity."  He has a 
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 
some aphasia and right-sided weakness since that time. He was seen by Dr. Budyd Clark in consultation at that point in time and he was put on Plavix and 
remained on a statin.

 

PAST MEDICAL HISTORY:  Significant for bipolar disorder, dementia which may be 
vascular at least in part, history of left middle cerebral artery stroke, left 
hip replacement, hypertension, dyslipidemia, essential thrombocytosis treated 
with hydroxyurea, prostate cancer status post prostatectomy, gastroesophageal 
reflux.

 

MEDICATIONS:  At admission consist of:

1.  Atorvastatin 40 mg p.o. daily.

2.  Plavix 75 mg p.o. daily.

3.  Metoprolol 12.5 mg p.o. b.i.d.

4.  B12 1000 mcg p.o. daily.

5.  Ferrous gluconate 324 mg p.o. daily.

6.  Lamotrigine 125 mg p.o. q.h.s.

7.  Eliquis 2.5 mg p.o. b.i.d.

 

ALLERGIES:  He said to be allergic to AMOXICILLIN with unknown reaction as well 
as to CELECOXIB with unknown reaction.

 

REVIEW OF SYSTEMS:  From the patient is negative for headache.  He knows he 
came to the hospital yesterday, but he cannot really give a coherent 
description.  He tends to sit quietly with his eyes closed, but immediately 
respond in a loud voice when questions asked directly to him.  He has had 
cataracts out.  His weight is stable. He uses a walker at home.  He has at 
least 2 caregivers at home.  He smokes a pipe. He does not drink alcohol.

 

FAMILY HISTORY:  Noncontributory.

 

PHYSICAL EXAMINATION:  On physical examination, he is a thin elderly gentleman, 
sitting quietly in his hospital bed with a head on.  His eyes are closed, but 
he responds immediately to any questions.  Most recent temperature 98.3 orally, 
blood pressure is running generally about 130 to 150 systolic/60s diastolic, 
heart rates in the 60s and regular, and respiratory rate 16.  Oxygen saturation 
is 100% on room air.  Oral mucosa is moist and there is no oral trauma.  He is 
edentulous. Neurologically, pupils react equally from 3 to 2 mm.  Eye movements 
are full, but I tried to get him to track.  His behavior is somewhat erratic 
and he tends to perform motor movements and commands repetitively and 
spontaneously.  He moves his upper extremities briskly and strongly, but I 
cannot get him to do finger taps or finger-to-nose.  He raises both legs to 
command strongly and quickly.  There is no myoclonus or asterixis.  Funduscopic 
exam is normal bilaterally.  Tongue protrudes in the midline and palate rises 
symmetrically.  Plantar responses are flexor bilaterally.  He has a spastic 
catch in the right leg.

 

DIAGNOSTIC STUDIES/LAB DATA:  Laboratory data includes a CT of the brain, which 
I reviewed and reveals encephalomalacia in the left temporoparietal area 
consistent with an old infarction.  EKG unremarkable.  Laboratory studies 
notable for unremarkable urinalysis, chemistry profile with a sodium of 127 
yesterday, 128 today.  Chemistry profile otherwise unremarkable, lactic acid 
was 5.7 yesterday. INR normal at 0.96.  CBC notable for decreased white blood 
cell count 2.8 yesterday, up to 3.6 today.  Hemoglobin 10.4 yesterday, 10.9 
today; platelet count 67,000 yesterday, 68,000 today.

 

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  As best as I can tell 
and speak with the family, he is at his baseline.  His Plavix has been stopped 
because of his thrombocytopenia and Dr. Johnson has been consulted and will follow 
up as an outpatient.  Hydroxyurea has been stopped.

 

We talked about increasing lamotrigine with the family.  I spoke with Dr. Quach 
about a last night as well.  His family says he does very poorly and increased 
doses of lamotrigine becoming agitated and they did not want to increase it.  
We went ahead and started Keppra and I warned them potential side effects of 
Keppra including agitation, irritability, mood changes.  I told him if that 
does not effectively controlled his seizures without side effects, then we 
would have to consider alternatives.  Valproic acid would be a good choice for 
the patient with bipolar disorder, but not with his low platelet count.  For now
, we will continue Keppra 500 mg b.i.d.  His MRI pending to make sure he has 
not had another cerebrovascular event and if that is negative, I think he could 
be discharged home to his family where he has a 24-hour .

 

 [id number]/CPS #: [id number]

[facility]

## 2018-01-08 NOTE — PN
Progress Note





- Progress Note


Date of Service: 01/08/18


Note: 


laceration repair by Crystal MIJARES


has 3cm by 1/4cm laceration of scalp


cleaned area with saline 100cc


used lidocaine with epi 1/2cc local


staples 3 applied to area
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
## 2018-01-08 NOTE — PN
Subjective


Date of Service: 01/08/18


Interval History: 





Alert to name, Denies chest pain, Shortness of breath or abd pain.  Denies N/V/D


Family History: Unchanged from Admission


Social History: Unchanged from Admission


Past Medical History: Unchanged from Admission





Objective


Active Medications: 








Acetaminophen (Tylenol Tab*)  650 mg PO Q6H PRN


   PRN Reason: FEVER/PAIN


Apixaban (Eliquis)  2.5 mg PO BID [facility]


   Stop: 01/21/18 21:01


   Last Admin: 01/08/18 10:03 Dose:  2.5 mg


Atorvastatin Calcium (Lipitor*)  40 mg PO 1700 [facility]


   Last Admin: 01/08/18 17:17 Dose:  40 mg


Cefuroxime Axetil (Ceftin Tab(*))  250 mg PO BID [facility]


   Stop: 01/14/18 21:01


   Last Admin: 01/08/18 10:03 Dose:  250 mg


Docusate Sodium (Colace Cap*)  200 mg PO BID [facility]


   Last Admin: 01/08/18 10:03 Dose:  200 mg


Sodium Chloride (Ns 0.9% 1000 Ml*)  1,000 mls @ 50 mls/hr IV PER RATE [facility]


   Last Admin: 01/08/18 04:56 Dose:  50 mls/hr


Lamotrigine (Lamictal Tab(*))  25 mg PO BEDTIME [facility]


Lamotrigine (Lamictal Tab(*))  100 mg PO BEDTIME [facility]


Levetiracetam (Keppra Tab*)  500 mg PO BID [facility]


   Last Admin: 01/08/18 10:04 Dose:  500 mg


Magnesium Hydroxide (Milk Of Magnesia Liq*)  30 ml PO Q4H PRN


   PRN Reason: CONSTIPATION


Melatonin (Melatonin (Nf))  3 mg PO BEDTIME PRN; Protocol


   PRN Reason: Sleep


Metoprolol Tartrate (Lopressor Tab*)  12.5 mg PO Q12HR [facility]


   Last Admin: 01/08/18 10:04 Dose:  12.5 mg


Omeprazole (Prilosec Cap*)  20 mg PO DAILY@0600 [facility]


   Last Admin: 01/08/18 04:56 Dose:  20 mg


Omeprazole (Prilosec Cap*)  20 mg PO DAILY@0730 [facility]


   Last Admin: 01/08/18 10:04 Dose:  20 mg


Ondansetron HCl (Zofran Inj*)  4 mg IV Q6H PRN


   PRN Reason: NAUSEA


Oxycodone/Acetaminophen (Percocet 5/325 Tab*)  1 tab PO Q4H PRN


   PRN Reason: Moderate to severe pain








Oxygen Devices in Use Now: None


Appearance: alert to verbal, confused , pleasent


Eyes: No Scleral Icterus


Ears/Nose/Mouth/Throat: Clear Oropharnyx, Mucous Membranes Moist


Neck: NL Appearance and Movements; NL JVP, Trachea Midline


Respiratory: Symmetrical Chest Expansion and Respiratory Effort, Clear to 

Auscultation


Cardiovascular: NL Sounds; No Murmurs; No JVD, RRR, No Edema


Abdominal: NL Sounds; No Tenderness; No Distention


Extremities: No Edema, No Clubbing, Cyanosis


Skin: No Rash or Ulcers


Neurological: - - confused to place and time, pt has some expressive aphasia 


Nutrition: Taking PO's


Result Diagrams: 


 01/09/18 06:00





 01/09/18 06:00


Additional Lab and Data: 


 Lab Results











  01/07/18 01/07/18 01/07/18 Range/Units





  13:15 13:15 13:15 


 


INR (Anticoag Therapy)   0.96   (0.77-1.02)  


 


Sodium  127 L    (133-145)  mmol/L


 


Potassium  3.5    (3.5-5.0)  mmol/L


 


Chloride  95 L    (101-111)  mmol/L


 


Carbon Dioxide  23    (22-32)  mmol/L


 


Anion Gap  9    (2-11)  mmol/L


 


BUN  15    (6-24)  mg/dL


 


Creatinine  1.07    (0.67-1.17)  mg/dL


 


Est GFR ( Amer)  84.5    (>60)  


 


Est GFR (Non-Af Amer)  65.7    (>60)  


 


BUN/Creatinine Ratio  14.0    (8-20)  


 


Glucose  115 H    ()  mg/dL


 


Lactic Acid    5.7 H*  (0.5-2.0)  mmol/L


 


Calcium  8.5 L    (8.6-10.3)  mg/dL


 


Magnesium  2.0    (1.9-2.7)  mg/dL


 


Total Bilirubin  0.60    (0.2-1.0)  mg/dL


 


AST  31    (13-39)  U/L


 


ALT  26    (7-52)  U/L


 


Alkaline Phosphatase  104    ()  U/L


 


Troponin I  0.03    (<0.04)  ng/mL


 


Total Protein  6.1 L    (6.4-8.9)  g/dL


 


Albumin  3.6    (3.2-5.2)  g/dL


 


Globulin  2.5    (2-4)  g/dL


 


Albumin/Globulin Ratio  1.4    (1-3)  














Assess/Plan/Problems-Billing


Assessment: 





This is an 85 y.o male that carries a history of  CVA, HTN, hyperlipidemia, 

depression, thrombocytosis expressive aphasia related to CVA. presented to the 

emergency room for seizures.  Patient seen and evaluated by Dr. Ceja.  

Patient will be discharged.





- Patient Problems


(1) CAD (coronary artery disease)


Status: Acute   Code(s): I25.10 - ATHSCL HEART DISEASE OF NATIVE CORONARY 

ARTERY W/O ANG PCTRS   SNOMED Code(s): 11526478


   Comment: No c/o of chest pain- stable   





(2) DVT prophylaxis


Status: Acute   Priority: Low   Code(s): CVM3437 -    SNOMED Code(s): 943771566


   Comment: SCD's 


heparin held d/t thrombocytopenia   





(3) Aphasia complicating stroke


Status: Acute   Priority: High   Code(s): I63.9 - CEREBRAL INFARCTION, 

UNSPECIFIED; R47.01 - APHASIA   SNOMED Code(s): 77958345


   Comment: history of aphasia with prior CVA,- unchanged    





(4) Hypertension


Status: Chronic   Priority: Medium   Code(s): I10 - ESSENTIAL (PRIMARY) 

HYPERTENSION   SNOMED Code(s): 56201378


   Comment: - Controlled.


- Continue Metoprolol.   





(5) Hyperlipidemia


Status: Chronic   Priority: Medium   Code(s): E78.5 - HYPERLIPIDEMIA, 

UNSPECIFIED   SNOMED Code(s): 15365160


   Comment: - Continue statin.   





(6) Depression


Status: Acute   Code(s): F32.9 - MAJOR DEPRESSIVE DISORDER, SINGLE EPISODE, 

UNSPECIFIED   SNOMED Code(s): 44073166


   Comment: Continue lamictal.    





(7) H/O: CVA (cerebrovascular accident)


Status: Acute   Code(s): Z86.73 - PRSNL HX OF TIA (TIA), AND CEREB INFRC W/O 

RESID DEFICITS   SNOMED Code(s): 860346943


   Comment: Plavix d/c'd d/t thrombocytopenia   





(8) DNR (do not resuscitate)


Status: Acute   





(9) Seizure


Status: Acute   Code(s): R56.9 - UNSPECIFIED CONVULSIONS   SNOMED Code(s): 

85096072


   Comment: start Keppra 500 mg po BID


Follow up with Dr. Enrique in 1 month   





(10) Thrombocytopenia


Status: Acute   Code(s): D69.6 - THROMBOCYTOPENIA, UNSPECIFIED   SNOMED Code(s)

: [id number]


   Comment: stop hydroxyurea


stop plavix


repeat cbc in 1 week    


Status and Disposition: 





Patient discharge cancelled d/t fall

## 2018-01-09 VITALS — SYSTOLIC BLOOD PRESSURE: 139 MMHG | DIASTOLIC BLOOD PRESSURE: 69 MMHG

## 2018-01-09 LAB
BASOPHILS # BLD AUTO: 0 10^3/UL (ref 0–0.2)
EOSINOPHIL # BLD AUTO: 0.1 10^3/UL (ref 0–0.6)
HCT VFR BLD AUTO: 31 % (ref 42–52)
HGB BLD-MCNC: 10.8 G/DL (ref 14–18)
LYMPHOCYTES # BLD AUTO: 0.8 10^3/UL (ref 1–4.8)
MCH RBC QN AUTO: 37 PG (ref 27–31)
MCHC RBC AUTO-ENTMCNC: 35 G/DL (ref 31–36)
MCV RBC AUTO: 104 FL (ref 80–94)
MONOCYTES # BLD AUTO: 0.3 10^3/UL (ref 0–0.8)
NEUTROPHILS # BLD AUTO: 3 10^3/UL (ref 1.5–7.7)
NRBC # BLD AUTO: 0 10^3/UL
NRBC BLD QL AUTO: 0
PLATELET # BLD AUTO: 70 10^3/UL (ref 150–450)
RBC # BLD AUTO: 2.96 10^6/UL (ref 4–5.4)
WBC # BLD AUTO: 4.2 10^3/UL (ref 3.5–10.8)

## 2018-01-09 RX ADMIN — DOCUSATE SODIUM SCH MG: 100 CAPSULE, LIQUID FILLED ORAL at 00:03

## 2018-01-09 RX ADMIN — APIXABAN SCH: 2.5 TABLET, FILM COATED ORAL at 00:48

## 2018-01-09 RX ADMIN — OMEPRAZOLE SCH: 20 CAPSULE, DELAYED RELEASE ORAL at 07:49

## 2018-01-09 RX ADMIN — METOPROLOL TARTRATE SCH MG: 25 TABLET, FILM COATED ORAL at 09:18

## 2018-01-09 RX ADMIN — DOCUSATE SODIUM SCH MG: 100 CAPSULE, LIQUID FILLED ORAL at 09:18

## 2018-01-09 RX ADMIN — OMEPRAZOLE SCH MG: 20 CAPSULE, DELAYED RELEASE ORAL at 05:05

## 2018-01-09 RX ADMIN — LEVETIRACETAM SCH MG: 500 TABLET, FILM COATED ORAL at 09:17

## 2018-01-09 RX ADMIN — CEFUROXIME AXETIL SCH: 250 TABLET ORAL at 00:47

## 2018-01-09 RX ADMIN — LEVETIRACETAM SCH MG: 500 TABLET, FILM COATED ORAL at 00:04

## 2018-01-09 RX ADMIN — METOPROLOL TARTRATE SCH MG: 25 TABLET, FILM COATED ORAL at 00:04

## 2018-01-09 NOTE — PN
Subjective


Date of Service: 01/09/18


Interval History: 





no complaints, patient with expressive aphasia,  Denies answer some questions 

appropriately.  follows commands.  Denies shortness of breath, denies cp or abd 

pain,  denies N/V/D 


Family History: Unchanged from Admission


Social History: Unchanged from Admission


Past Medical History: Unchanged from Admission





Objective


 Vital Signs - 8 hr











  01/09/18





  11:21


 


Temperature 98.4 F


 


Pulse Rate 65


 


Respiratory 14





Rate 


 


Blood Pressure 139/69





(mmHg) 


 


O2 Sat by Pulse 100





Oximetry 











Oxygen Devices in Use Now: None


Appearance: alert, pleasent, appears comfortable


Eyes: No Scleral Icterus


Ears/Nose/Mouth/Throat: Clear Oropharnyx, Mucous Membranes Moist


Neck: NL Appearance and Movements; NL JVP, Trachea Midline


Respiratory: Symmetrical Chest Expansion and Respiratory Effort, Clear to 

Auscultation


Cardiovascular: NL Sounds; No Murmurs; No JVD, RRR, No Edema


Abdominal: NL Sounds; No Tenderness; No Distention


Extremities: No Edema, No Clubbing, Cyanosis


Skin: No Rash or Ulcers


Neurological: NL Muscle Strength and Tone, - - confused to place and time


Nutrition: Taking PO's


Result Diagrams: 


 01/09/18 06:00





 01/09/18 06:00


Additional Lab and Data: 


 Lab Results











  01/07/18 01/07/18 01/07/18 Range/Units





  13:15 13:15 13:15 


 


INR (Anticoag Therapy)   0.96   (0.77-1.02)  


 


Sodium  127 L    (133-145)  mmol/L


 


Potassium  3.5    (3.5-5.0)  mmol/L


 


Chloride  95 L    (101-111)  mmol/L


 


Carbon Dioxide  23    (22-32)  mmol/L


 


Anion Gap  9    (2-11)  mmol/L


 


BUN  15    (6-24)  mg/dL


 


Creatinine  1.07    (0.67-1.17)  mg/dL


 


Est GFR ( Amer)  84.5    (>60)  


 


Est GFR (Non-Af Amer)  65.7    (>60)  


 


BUN/Creatinine Ratio  14.0    (8-20)  


 


Glucose  115 H    ()  mg/dL


 


Lactic Acid    5.7 H*  (0.5-2.0)  mmol/L


 


Calcium  8.5 L    (8.6-10.3)  mg/dL


 


Magnesium  2.0    (1.9-2.7)  mg/dL


 


Total Bilirubin  0.60    (0.2-1.0)  mg/dL


 


AST  31    (13-39)  U/L


 


ALT  26    (7-52)  U/L


 


Alkaline Phosphatase  104    ()  U/L


 


Troponin I  0.03    (<0.04)  ng/mL


 


Total Protein  6.1 L    (6.4-8.9)  g/dL


 


Albumin  3.6    (3.2-5.2)  g/dL


 


Globulin  2.5    (2-4)  g/dL


 


Albumin/Globulin Ratio  1.4    (1-3)  














Assess/Plan/Problems-Billing


Assessment: 





This is an 85 y.o male that carries a history of  CVA, HTN, hyperlipidemia, 

depression, thrombocytosis expressive aphasia related to CVA. presented to the 

emergency room for seizures.  Patient seen and evaluated by Dr. Ceja.  

Patient will be discharged.





- Patient Problems


(1) CAD (coronary artery disease)


Status: Acute   Code(s): I25.10 - ATHSCL HEART DISEASE OF NATIVE CORONARY 

ARTERY W/O ANG PCTRS   SNOMED Code(s): 32243803


   Comment: No c/o of chest pain- stable   





(2) DVT prophylaxis


Status: Acute   Priority: Low   Code(s): TRN3645 -    SNOMED Code(s): 046100829


   Comment: SCD's 


heparin held d/t thrombocytopenia   





(3) Aphasia complicating stroke


Status: Acute   Priority: High   Code(s): I63.9 - CEREBRAL INFARCTION, 

UNSPECIFIED; R47.01 - APHASIA   SNOMED Code(s): 55272579


   Comment: history of aphasia with prior CVA,- unchanged    





(4) Hypertension


Status: Chronic   Priority: Medium   Code(s): I10 - ESSENTIAL (PRIMARY) 

HYPERTENSION   SNOMED Code(s): 26280822


   Comment: - Controlled.


- Continue Metoprolol.   





(5) Hyperlipidemia


Status: Chronic   Priority: Medium   Code(s): E78.5 - HYPERLIPIDEMIA, 

UNSPECIFIED   SNOMED Code(s): 50935884


   Comment: - Continue statin.   





(6) Depression


Status: Acute   Code(s): F32.9 - MAJOR DEPRESSIVE DISORDER, SINGLE EPISODE, 

UNSPECIFIED   SNOMED Code(s): 42067403


   Comment: Continue lamictal.    





(7) H/O: CVA (cerebrovascular accident)


Status: Acute   Code(s): Z86.73 - PRSNL HX OF TIA (TIA), AND CEREB INFRC W/O 

RESID DEFICITS   SNOMED Code(s): 604512714


   Comment: Plavix d/c'd d/t thrombocytopenia   





(8) DNR (do not resuscitate)


Status: Acute   





(9) Seizure


Status: Acute   Code(s): R56.9 - UNSPECIFIED CONVULSIONS   SNOMED Code(s): 

87524988


   Comment: start Keppra 500 mg po BID


Follow up with Dr. Enrique in 1 month   





(10) Thrombocytopenia


Status: Acute   Code(s): D69.6 - THROMBOCYTOPENIA, UNSPECIFIED   SNOMED Code(s)

: 813896638


   Comment: stop hydroxyurea


stop plavix


repeat cbc in 1 week    





(11) Head injury


Status: Acute   Code(s): S09.90XA - UNSPECIFIED INJURY OF HEAD, INITIAL 

ENCOUNTER   SNOMED Code(s): 21910406


   Comment: laceration to posterior head - repaired with 3 staples last night


staples removed in 7 days    


Status and Disposition: 





Patient will be discharged home with son.





Follow up with Dr. Enrique in 1 month call the office for appointment.


Follow up with Dr. Johnson this week 


Follow up with your primary care next week - staples removed in 7 days from 

posterior head


Repeat CBC in 1 week





Patient will be discharged with a prescription for Levetiracetam.


Patient is instructed to stop hydroxyurea and Plavix.


Patient is agreeable with this plan.


RETURN TO THE EMERGENCY DEPARTMENT FOR CHANGING OR WORSENING SYMPTOMS.

## 2018-01-09 NOTE — EEG
ELECTROENCEPHALOGRAPHY:

 

DATE OF STUDY:  01/08/18

 

REFERRING PHYSICIAN:  Dr. Frankenberg.

 

LOCATION:  He is an inpatient in room 451.

 

CLINICAL PROBLEM:  History of left hemisphere stroke, seizure the night of admission.

 

MEDICATIONS:  Include:

 

1.  Lamotrigine.

2.  Keppra.

3.  Zofran.

4.  Prilosec.

 

REPORT:  This 16-channel EEG is remarkable for background rhythms consisting of fairly rhythmic alpha
 activity from the right occipital derivations which is barely 8 cycles per second, sometimes close t
o 6 cycles per second.  There is a low- abundant poorly formed, more theta range rhythm seen in the l
eft occipital derivation.  The patient is clinically awake.  Rhythmic central theta and some delta ra
nge frequencies at about 4 cycles per second are seen episodically suggestive of drowsiness.  Beta ac
tivity is seen bifrontally.  Activation procedures are not attempted.  Muscle and movement artifacts 
are seen occasionally. The patient does not appear to sleep during the recording.

 

CLINICAL IMPRESSION:  Abnormal EEG due to asymmetry of background rhythms with slower rhythms noted f
rom the left hemisphere.  This is compatible with left hemisphere dysfunction and could be representa
tive of prior cerebrovascular disease or postictal state.  There are no epileptiform discharges durin
g this recording.

 

 

 

[id number]/[facility] #: [id number]

## 2018-01-10 NOTE — DS
DISCHARGE SUMMARY:

 

DATE OF ADMISSION:  01/08/18

 

DATE OF DISCHARGE:  01/09/18

 

PRIMARY CARE PROVIDER:  Dr. Nguyễn.

 

ATTENDING PHYSICIAN:  Dr. Amelia Pemberton (dictated by Chelly Mercer NP)

 

PRIMARY DIAGNOSIS:  Seizure.

 

SECONDARY DIAGNOSES:

1.  Left middle cerebral artery cerebrovascular accident in 2016.

2.  Hypertension.

3.  Hyperlipidemia.

4.  Thrombocytosis.

5.  Prostate cancer, [place] 3+3, status post radical prostatectomy.

6.  Gastroesophageal reflux disease.

7.  Bipolar disorder.

8.  Depression.

 

STUDIES WHILE IN THE HOSPITAL:  He had a CT of the brain on 01/07/18 and radiologist's impression:

 

1.  No evidence of acute findings.

2.  Moderate-sized area of encephalomalacia in the posterior left parietal lobe consistent with an ol
d infarct.

3.  Atrophic and moderate-to-severe chronic small vessel ischemic changes.

 

Chest x-ray on 01/07/18, no evidence of acute disease.

 

He also had electroencephalogram on 01/08/18, clinical impression:  Abnormal EEG due to asymmetry of 
background rhythms with slower rhythms noted from the left hemisphere.  This is compatible with the l
eft hemisphere dysfunction and could be representative of a prior cerebrovascular disease or posticta
l state.  There is no epileptiform discharges during this recording. He also had an MRI of the brain 
on 01/08/18, impression:

 

1.  Left temporal and inferior parietal encephalomalacia consistent with remote infarct with associat
ed Wallerian degeneration and volume loss of the left nasal temporal lobe.

2.  Diffuse elevated T2/FLAIR signals in the periventricular and subcortical white matter suggesting 
chronic small vessel ischemia.

 

On 01/08/18, he had a repeat CT of the head and C-spine after a fall in which he struck his head and 
had a laceration to the back of his head after the nurse heard yelling from the room between him and 
his son.

 

CT of the brain on 01/08/18 at 2047, impression:  Stable left frontoparietal encephalomalacia consist
ent with remote infarct, no acute intracranial pathology.

 

CT of the C-spine showed, impression:  Osteopenia, degenerative disk disease, and osteoarthritis, no 
acute osseous injury of the cervical spine.

 

DISCHARGE MEDICATIONS:  He will be discharged home on Keppra 500 mg p.o. b.i.d.

 

Continued home medications:

 

1.  Atorvastatin 40 mg p.o. daily.

2.  Acetaminophen 975 q.6 hours as needed.

3.  Fish oil 1000 mg p.o. daily.

4.  Metoprolol 12.5 mg p.o. b.i.d.

5.  Milk of magnesia 30 mg p.o. q.4 hours as needed.

6.  Ferrous gluconate 324 mg p.o. daily.

7.  Vitamin B12 1000 mcg p.o. daily.

8.  Oxycodone/acetaminophen 1 to 2 tablets every 4 hours as needed for pain.

9.  Lamictal 125 mg p.o. at bedtime.

10.  Omeprazole 20 mg daily.

 

Medications that were discontinued during the stay were:

 

1.  Plavix 75 mg.

2.  Hydroxyurea.

 

HISTORY OF PRESENT ILLNESS AND HOSPITAL COURSE:  Mr. Ian Tubbs is an 85-year- old male with a 
history of left MCA CVA with residual expressive aphasia, most notable for his use of the word _____ 
as a substitute for other words.  Prior to his admission to the emergency room, he was home with his 
son.  He had gone into the bathroom and he was unsteady, he developed some generalized convulsions fo
r which EMS was called, and then en route to the hospital, he had a grand mal seizure despite being o
n Lamictal for his bipolar disorder and on arrival to the ER, he was postictal.  There was no report 
of loss of bowel or bladder function.

 

While in the emergency room, he received CT of his head, an MRI, he had an EEG, which results were de
scribed above previously and exams during his hospital stay. Essentially, the EEG did not show any ep
ileptiform discharges.  Dr. Enrique from Neurology was consulted and saw the patient in the emergency
 room.  He recommended he be started on Keppra 500 mg b.i.d.  He was admitted to the hospital for obs
ervation overnight.  We consulted Bk Johnson MD, from Heme/Oncology, about his thrombocytopenia, f
or which he recommended discontinuing the hydroxyurea and Plavix.  On 01/08/18, I had reviewed the bijan lerma instructions with the son and he was agreeable for discharge home.  Sometime after reviewing 
the discharge instructions, the son and the father, the nurse heard started yelling from the room and
 the patient when the nurse entered the room was found on the floor, bleeding from the back of his he
ad.  He received a stat C-spine and CT of the brain, which were negative.  He had a laceration repair
 to the posterior aspect of his head with 3 staples.  The patient's discharge was canceled.  On 01/09
/18, the  and case management and his other son, Eligio, his case was discussed amongst 
them, Eligio felt that it was safe for him to return home.  I also got a consult from palliative care 
su.  The son was questioning whether he will be eligible for hospice care at home.  Case manag
ement and  have deemed Mr. Tubbs safe to return to his home.  He does have caregi
vers and aide services at home to help him.  He did have a PT assessment while in the hospital, which
 stated he was safe to return home.  At this time, I feel that Mr. Tubbs is safe to return home.


 

Mr. Tubbs is stable for discharge today.  Vital signs are as follows: Temperature was 98.4, hear
t rate was 65, respirations 14, O2 saturation was 100% on room air, blood pressure was 139/69.

 

DISCHARGE PLAN:  Ms. Tubbs will be discharged back home.  The patient is to have staples removed
 from the back of his head in 7 days.

 

ACTIVITY:  As tolerated.

 

DIET:  He may resume his previous diet.

 

In regards to his seizure, he will be placed on Keppra 500 mg p.o. b.i.d.  He will resume all his oth
er home medications with the exception of Plavix and hydroxyurea.

 

FOLLOWUP:  For followup, he should follow up with his primary care provider in 4 to 7 days.  He shoul
d follow up with Hematology/Oncology, Dr. Bk Johnson, in 4 to 7 days.  He has an appointment with h
is primary care doctor, Dr. Nguyễn, on _____ at 3 p.m.  He should follow up with Dr. Ian Enrique in
 1 month.  Visiting nurse service has also been contacted for the referral.  I have reviewed this dis
charge plan with his son, Eligio, who was present in the room.  The patient and family were instructed 
to return to the emergency room with any further seizures.

 

This is a summary of report of a complex medical history and hospital stay.  For further details, ple
ase see the entire medical record.

 

TIME SPENT:  Time spent on this discharge was approximately 60 minutes; more than half the time was s
pent with the patient and family discussing discharge plans and instructions.

 

CONDITION ON DISCHARGE:  Stable.

 

____________________________________ CHELLY MERCER NP

 

[id number]/[facility] #: [id number]